# Patient Record
Sex: MALE | Employment: FULL TIME
[De-identification: names, ages, dates, MRNs, and addresses within clinical notes are randomized per-mention and may not be internally consistent; named-entity substitution may affect disease eponyms.]

---

## 2023-05-06 ENCOUNTER — APPOINTMENT (OUTPATIENT)
Facility: HOSPITAL | Age: 63
DRG: 065 | End: 2023-05-06
Payer: COMMERCIAL

## 2023-05-06 ENCOUNTER — HOSPITAL ENCOUNTER (INPATIENT)
Facility: HOSPITAL | Age: 63
LOS: 3 days | Discharge: HOME | DRG: 065 | End: 2023-05-09
Attending: EMERGENCY MEDICINE | Admitting: INTERNAL MEDICINE
Payer: COMMERCIAL

## 2023-05-06 DIAGNOSIS — R53.1 LEFT-SIDED WEAKNESS: Primary | ICD-10-CM

## 2023-05-06 DIAGNOSIS — I61.9 CVA (CEREBROVASCULAR ACCIDENT DUE TO INTRACEREBRAL HEMORRHAGE) (HCC): ICD-10-CM

## 2023-05-06 LAB
ALBUMIN SERPL-MCNC: 3.9 G/DL (ref 3.5–5)
ALBUMIN/GLOB SERPL: 1.1 (ref 1.1–2.2)
ALP SERPL-CCNC: 68 U/L (ref 45–117)
ALT SERPL-CCNC: 26 U/L (ref 12–78)
ANION GAP SERPL CALC-SCNC: 5 MMOL/L (ref 5–15)
AST SERPL-CCNC: 21 U/L (ref 15–37)
BASOPHILS # BLD: 0 K/UL (ref 0–0.1)
BASOPHILS NFR BLD: 0 % (ref 0–1)
BILIRUB SERPL-MCNC: 0.9 MG/DL (ref 0.2–1)
BUN SERPL-MCNC: 22 MG/DL (ref 6–20)
BUN/CREAT SERPL: 17 (ref 12–20)
CALCIUM SERPL-MCNC: 9.8 MG/DL (ref 8.5–10.1)
CHLORIDE SERPL-SCNC: 107 MMOL/L (ref 97–108)
CO2 SERPL-SCNC: 27 MMOL/L (ref 21–32)
CREAT SERPL-MCNC: 1.29 MG/DL (ref 0.7–1.3)
DIFFERENTIAL METHOD BLD: NORMAL
EOSINOPHIL # BLD: 0.3 K/UL (ref 0–0.4)
EOSINOPHIL NFR BLD: 4 % (ref 0–7)
ERYTHROCYTE [DISTWIDTH] IN BLOOD BY AUTOMATED COUNT: 13.5 % (ref 11.5–14.5)
GLOBULIN SER CALC-MCNC: 3.4 G/DL (ref 2–4)
GLUCOSE SERPL-MCNC: 107 MG/DL (ref 65–100)
HCT VFR BLD AUTO: 44.5 % (ref 36.6–50.3)
HGB BLD-MCNC: 14.6 G/DL (ref 12.1–17)
IMM GRANULOCYTES # BLD AUTO: 0 K/UL (ref 0–0.04)
IMM GRANULOCYTES NFR BLD AUTO: 0 % (ref 0–0.5)
INR PPP: 1 (ref 0.9–1.1)
LYMPHOCYTES # BLD: 1.7 K/UL (ref 0.8–3.5)
LYMPHOCYTES NFR BLD: 18 % (ref 12–49)
MCH RBC QN AUTO: 31.1 PG (ref 26–34)
MCHC RBC AUTO-ENTMCNC: 32.8 G/DL (ref 30–36.5)
MCV RBC AUTO: 94.7 FL (ref 80–99)
MONOCYTES # BLD: 0.6 K/UL (ref 0–1)
MONOCYTES NFR BLD: 6 % (ref 5–13)
NEUTS SEG # BLD: 7.1 K/UL (ref 1.8–8)
NEUTS SEG NFR BLD: 72 % (ref 32–75)
NRBC # BLD: 0 K/UL (ref 0–0.01)
NRBC BLD-RTO: 0 PER 100 WBC
PLATELET # BLD AUTO: 202 K/UL (ref 150–400)
PMV BLD AUTO: 9.1 FL (ref 8.9–12.9)
POTASSIUM SERPL-SCNC: 4.1 MMOL/L (ref 3.5–5.1)
PROT SERPL-MCNC: 7.3 G/DL (ref 6.4–8.2)
PROTHROMBIN TIME: 10.3 SEC (ref 9–11.1)
RBC # BLD AUTO: 4.7 M/UL (ref 4.1–5.7)
SODIUM SERPL-SCNC: 139 MMOL/L (ref 136–145)
TROPONIN I SERPL HS-MCNC: 11 NG/L (ref 0–76)
WBC # BLD AUTO: 9.8 K/UL (ref 4.1–11.1)

## 2023-05-06 PROCEDURE — 85610 PROTHROMBIN TIME: CPT

## 2023-05-06 PROCEDURE — 6370000000 HC RX 637 (ALT 250 FOR IP): Performed by: INTERNAL MEDICINE

## 2023-05-06 PROCEDURE — 36415 COLL VENOUS BLD VENIPUNCTURE: CPT

## 2023-05-06 PROCEDURE — 80053 COMPREHEN METABOLIC PANEL: CPT

## 2023-05-06 PROCEDURE — 6370000000 HC RX 637 (ALT 250 FOR IP): Performed by: EMERGENCY MEDICINE

## 2023-05-06 PROCEDURE — 6360000002 HC RX W HCPCS: Performed by: INTERNAL MEDICINE

## 2023-05-06 PROCEDURE — 70450 CT HEAD/BRAIN W/O DYE: CPT

## 2023-05-06 PROCEDURE — 1100000000 HC RM PRIVATE

## 2023-05-06 PROCEDURE — 99285 EMERGENCY DEPT VISIT HI MDM: CPT

## 2023-05-06 PROCEDURE — 83921 ORGANIC ACID SINGLE QUANT: CPT

## 2023-05-06 PROCEDURE — 70551 MRI BRAIN STEM W/O DYE: CPT

## 2023-05-06 PROCEDURE — 84484 ASSAY OF TROPONIN QUANT: CPT

## 2023-05-06 PROCEDURE — 85025 COMPLETE CBC W/AUTO DIFF WBC: CPT

## 2023-05-06 RX ORDER — POLYETHYLENE GLYCOL 3350 17 G/17G
17 POWDER, FOR SOLUTION ORAL DAILY PRN
Status: DISCONTINUED | OUTPATIENT
Start: 2023-05-06 | End: 2023-05-09 | Stop reason: HOSPADM

## 2023-05-06 RX ORDER — ONDANSETRON 4 MG/1
4 TABLET, ORALLY DISINTEGRATING ORAL EVERY 8 HOURS PRN
Status: DISCONTINUED | OUTPATIENT
Start: 2023-05-06 | End: 2023-05-09 | Stop reason: HOSPADM

## 2023-05-06 RX ORDER — LABETALOL HYDROCHLORIDE 5 MG/ML
10 INJECTION, SOLUTION INTRAVENOUS EVERY 10 MIN PRN
Status: DISCONTINUED | OUTPATIENT
Start: 2023-05-06 | End: 2023-05-09 | Stop reason: HOSPADM

## 2023-05-06 RX ORDER — ASPIRIN 81 MG/1
81 TABLET, CHEWABLE ORAL
Status: ACTIVE | OUTPATIENT
Start: 2023-05-06 | End: 2023-05-07

## 2023-05-06 RX ORDER — CLOPIDOGREL BISULFATE 75 MG/1
75 TABLET ORAL DAILY
Status: DISCONTINUED | OUTPATIENT
Start: 2023-05-06 | End: 2023-05-09 | Stop reason: HOSPADM

## 2023-05-06 RX ORDER — ONDANSETRON 2 MG/ML
4 INJECTION INTRAMUSCULAR; INTRAVENOUS EVERY 6 HOURS PRN
Status: DISCONTINUED | OUTPATIENT
Start: 2023-05-06 | End: 2023-05-09 | Stop reason: HOSPADM

## 2023-05-06 RX ORDER — ROSUVASTATIN CALCIUM 40 MG/1
40 TABLET, COATED ORAL NIGHTLY
Status: DISCONTINUED | OUTPATIENT
Start: 2023-05-06 | End: 2023-05-09 | Stop reason: HOSPADM

## 2023-05-06 RX ORDER — ENOXAPARIN SODIUM 100 MG/ML
30 INJECTION SUBCUTANEOUS 2 TIMES DAILY
Status: DISCONTINUED | OUTPATIENT
Start: 2023-05-06 | End: 2023-05-09 | Stop reason: HOSPADM

## 2023-05-06 RX ORDER — CLOPIDOGREL BISULFATE 75 MG/1
75 TABLET ORAL
Status: COMPLETED | OUTPATIENT
Start: 2023-05-06 | End: 2023-05-06

## 2023-05-06 RX ORDER — ASPIRIN 81 MG/1
81 TABLET ORAL DAILY
Status: DISCONTINUED | OUTPATIENT
Start: 2023-05-06 | End: 2023-05-09 | Stop reason: HOSPADM

## 2023-05-06 RX ORDER — NICOTINE 21 MG/24HR
1 PATCH, TRANSDERMAL 24 HOURS TRANSDERMAL DAILY
Status: DISCONTINUED | OUTPATIENT
Start: 2023-05-06 | End: 2023-05-09 | Stop reason: HOSPADM

## 2023-05-06 RX ADMIN — ENOXAPARIN SODIUM 30 MG: 100 INJECTION SUBCUTANEOUS at 22:57

## 2023-05-06 RX ADMIN — ROSUVASTATIN 40 MG: 40 TABLET, FILM COATED ORAL at 22:58

## 2023-05-06 RX ADMIN — CLOPIDOGREL BISULFATE 75 MG: 75 TABLET ORAL at 19:18

## 2023-05-06 ASSESSMENT — LIFESTYLE VARIABLES
HOW MANY STANDARD DRINKS CONTAINING ALCOHOL DO YOU HAVE ON A TYPICAL DAY: PATIENT DOES NOT DRINK
HOW OFTEN DO YOU HAVE A DRINK CONTAINING ALCOHOL: MONTHLY OR LESS

## 2023-05-06 ASSESSMENT — PAIN DESCRIPTION - LOCATION: LOCATION: HEAD

## 2023-05-06 ASSESSMENT — ENCOUNTER SYMPTOMS
SHORTNESS OF BREATH: 0
ABDOMINAL DISTENTION: 0

## 2023-05-06 ASSESSMENT — PAIN SCALES - GENERAL: PAINLEVEL_OUTOF10: 2

## 2023-05-07 ENCOUNTER — APPOINTMENT (OUTPATIENT)
Facility: HOSPITAL | Age: 63
DRG: 065 | End: 2023-05-07
Payer: COMMERCIAL

## 2023-05-07 LAB
CHOLEST SERPL-MCNC: 191 MG/DL
EKG ATRIAL RATE: 87 BPM
EKG DIAGNOSIS: NORMAL
EKG P AXIS: 32 DEGREES
EKG P-R INTERVAL: 136 MS
EKG Q-T INTERVAL: 348 MS
EKG QRS DURATION: 82 MS
EKG QTC CALCULATION (BAZETT): 418 MS
EKG R AXIS: 6 DEGREES
EKG T AXIS: -8 DEGREES
EKG VENTRICULAR RATE: 87 BPM
ERYTHROCYTE [DISTWIDTH] IN BLOOD BY AUTOMATED COUNT: 13.5 % (ref 11.5–14.5)
EST. AVERAGE GLUCOSE BLD GHB EST-MCNC: 117 MG/DL
HBA1C MFR BLD: 5.7 % (ref 4–5.6)
HCT VFR BLD AUTO: 43.5 % (ref 36.6–50.3)
HDLC SERPL-MCNC: 40 MG/DL
HDLC SERPL: 4.8 (ref 0–5)
HGB BLD-MCNC: 14 G/DL (ref 12.1–17)
LDLC SERPL CALC-MCNC: 129 MG/DL (ref 0–100)
MCH RBC QN AUTO: 31 PG (ref 26–34)
MCHC RBC AUTO-ENTMCNC: 32.2 G/DL (ref 30–36.5)
MCV RBC AUTO: 96.2 FL (ref 80–99)
NRBC # BLD: 0 K/UL (ref 0–0.01)
NRBC BLD-RTO: 0 PER 100 WBC
PLATELET # BLD AUTO: 189 K/UL (ref 150–400)
PMV BLD AUTO: 9.1 FL (ref 8.9–12.9)
RBC # BLD AUTO: 4.52 M/UL (ref 4.1–5.7)
TRIGL SERPL-MCNC: 110 MG/DL
VLDLC SERPL CALC-MCNC: 22 MG/DL
WBC # BLD AUTO: 9.3 K/UL (ref 4.1–11.1)

## 2023-05-07 PROCEDURE — 70498 CT ANGIOGRAPHY NECK: CPT

## 2023-05-07 PROCEDURE — 99222 1ST HOSP IP/OBS MODERATE 55: CPT | Performed by: INTERNAL MEDICINE

## 2023-05-07 PROCEDURE — 1100000000 HC RM PRIVATE

## 2023-05-07 PROCEDURE — 97165 OT EVAL LOW COMPLEX 30 MIN: CPT

## 2023-05-07 PROCEDURE — 6370000000 HC RX 637 (ALT 250 FOR IP): Performed by: INTERNAL MEDICINE

## 2023-05-07 PROCEDURE — 6360000002 HC RX W HCPCS: Performed by: INTERNAL MEDICINE

## 2023-05-07 PROCEDURE — 83036 HEMOGLOBIN GLYCOSYLATED A1C: CPT

## 2023-05-07 PROCEDURE — 6360000004 HC RX CONTRAST MEDICATION: Performed by: INTERNAL MEDICINE

## 2023-05-07 PROCEDURE — 97535 SELF CARE MNGMENT TRAINING: CPT

## 2023-05-07 PROCEDURE — 36415 COLL VENOUS BLD VENIPUNCTURE: CPT

## 2023-05-07 PROCEDURE — 97161 PT EVAL LOW COMPLEX 20 MIN: CPT

## 2023-05-07 PROCEDURE — 97530 THERAPEUTIC ACTIVITIES: CPT

## 2023-05-07 PROCEDURE — 97116 GAIT TRAINING THERAPY: CPT

## 2023-05-07 PROCEDURE — 97112 NEUROMUSCULAR REEDUCATION: CPT

## 2023-05-07 PROCEDURE — 80061 LIPID PANEL: CPT

## 2023-05-07 PROCEDURE — 85027 COMPLETE CBC AUTOMATED: CPT

## 2023-05-07 RX ADMIN — IOPAMIDOL 100 ML: 755 INJECTION, SOLUTION INTRAVENOUS at 11:42

## 2023-05-07 RX ADMIN — ROSUVASTATIN 40 MG: 40 TABLET, FILM COATED ORAL at 21:02

## 2023-05-07 RX ADMIN — ENOXAPARIN SODIUM 30 MG: 100 INJECTION SUBCUTANEOUS at 21:02

## 2023-05-07 RX ADMIN — ENOXAPARIN SODIUM 30 MG: 100 INJECTION SUBCUTANEOUS at 09:19

## 2023-05-07 RX ADMIN — ASPIRIN 81 MG: 81 TABLET, COATED ORAL at 09:18

## 2023-05-07 RX ADMIN — CLOPIDOGREL BISULFATE 75 MG: 75 TABLET ORAL at 09:18

## 2023-05-07 SDOH — HEALTH STABILITY: PHYSICAL HEALTH: ON AVERAGE, HOW MANY DAYS PER WEEK DO YOU ENGAGE IN MODERATE TO STRENUOUS EXERCISE (LIKE A BRISK WALK)?: 0 DAYS

## 2023-05-07 SDOH — ECONOMIC STABILITY: HOUSING INSECURITY
IN THE LAST 12 MONTHS, WAS THERE A TIME WHEN YOU DID NOT HAVE A STEADY PLACE TO SLEEP OR SLEPT IN A SHELTER (INCLUDING NOW)?: NO

## 2023-05-07 SDOH — ECONOMIC STABILITY: INCOME INSECURITY: IN THE LAST 12 MONTHS, WAS THERE A TIME WHEN YOU WERE NOT ABLE TO PAY THE MORTGAGE OR RENT ON TIME?: NO

## 2023-05-07 SDOH — ECONOMIC STABILITY: TRANSPORTATION INSECURITY
IN THE PAST 12 MONTHS, HAS THE LACK OF TRANSPORTATION KEPT YOU FROM MEDICAL APPOINTMENTS OR FROM GETTING MEDICATIONS?: NO

## 2023-05-07 SDOH — ECONOMIC STABILITY: FOOD INSECURITY: WITHIN THE PAST 12 MONTHS, THE FOOD YOU BOUGHT JUST DIDN'T LAST AND YOU DIDN'T HAVE MONEY TO GET MORE.: NEVER TRUE

## 2023-05-07 SDOH — HEALTH STABILITY: PHYSICAL HEALTH: ON AVERAGE, HOW MANY MINUTES DO YOU ENGAGE IN EXERCISE AT THIS LEVEL?: 0 MIN

## 2023-05-07 SDOH — ECONOMIC STABILITY: FOOD INSECURITY: WITHIN THE PAST 12 MONTHS, YOU WORRIED THAT YOUR FOOD WOULD RUN OUT BEFORE YOU GOT MONEY TO BUY MORE.: NEVER TRUE

## 2023-05-07 SDOH — ECONOMIC STABILITY: HOUSING INSECURITY: IN THE LAST 12 MONTHS, HOW MANY PLACES HAVE YOU LIVED?: 1

## 2023-05-07 SDOH — ECONOMIC STABILITY: TRANSPORTATION INSECURITY
IN THE PAST 12 MONTHS, HAS LACK OF TRANSPORTATION KEPT YOU FROM MEETINGS, WORK, OR FROM GETTING THINGS NEEDED FOR DAILY LIVING?: NO

## 2023-05-07 ASSESSMENT — SOCIAL DETERMINANTS OF HEALTH (SDOH)
WITHIN THE LAST YEAR, HAVE YOU BEEN AFRAID OF YOUR PARTNER OR EX-PARTNER?: NO
DO YOU BELONG TO ANY CLUBS OR ORGANIZATIONS SUCH AS CHURCH GROUPS UNIONS, FRATERNAL OR ATHLETIC GROUPS, OR SCHOOL GROUPS?: NO
HOW OFTEN DO YOU ATTENT MEETINGS OF THE CLUB OR ORGANIZATION YOU BELONG TO?: NEVER
IN A TYPICAL WEEK, HOW MANY TIMES DO YOU TALK ON THE PHONE WITH FAMILY, FRIENDS, OR NEIGHBORS?: MORE THAN THREE TIMES A WEEK
WITHIN THE LAST YEAR, HAVE YOU BEEN HUMILIATED OR EMOTIONALLY ABUSED IN OTHER WAYS BY YOUR PARTNER OR EX-PARTNER?: NO
WITHIN THE LAST YEAR, HAVE TO BEEN RAPED OR FORCED TO HAVE ANY KIND OF SEXUAL ACTIVITY BY YOUR PARTNER OR EX-PARTNER?: NO
HOW OFTEN DO YOU GET TOGETHER WITH FRIENDS OR RELATIVES?: MORE THAN THREE TIMES A WEEK
WITHIN THE LAST YEAR, HAVE YOU BEEN KICKED, HIT, SLAPPED, OR OTHERWISE PHYSICALLY HURT BY YOUR PARTNER OR EX-PARTNER?: NO
HOW OFTEN DO YOU ATTEND CHURCH OR RELIGIOUS SERVICES?: 1 TO 4 TIMES PER YEAR
ARE YOU MARRIED, WIDOWED, DIVORCED, SEPARATED, NEVER MARRIED, OR LIVING WITH A PARTNER?: PATIENT DECLINED
HOW HARD IS IT FOR YOU TO PAY FOR THE VERY BASICS LIKE FOOD, HOUSING, MEDICAL CARE, AND HEATING?: NOT VERY HARD

## 2023-05-07 ASSESSMENT — PAIN SCALES - GENERAL
PAINLEVEL_OUTOF10: 0

## 2023-05-07 ASSESSMENT — PATIENT HEALTH QUESTIONNAIRE - PHQ9
1. LITTLE INTEREST OR PLEASURE IN DOING THINGS: NOT AT ALL
SUM OF ALL RESPONSES TO PHQ9 QUESTIONS 1 & 2: 0
2. FEELING DOWN, DEPRESSED OR HOPELESS: NOT AT ALL

## 2023-05-08 ENCOUNTER — APPOINTMENT (OUTPATIENT)
Facility: HOSPITAL | Age: 63
DRG: 065 | End: 2023-05-08
Payer: COMMERCIAL

## 2023-05-08 LAB
ECHO AO ROOT DIAM: 3.2 CM
ECHO AO ROOT INDEX: 1.36 CM/M2
ECHO AV AREA PEAK VELOCITY: 2.7 CM2
ECHO AV AREA/BSA PEAK VELOCITY: 1.1 CM2/M2
ECHO AV PEAK GRADIENT: 15 MMHG
ECHO AV PEAK VELOCITY: 2 M/S
ECHO AV VELOCITY RATIO: 0.65
ECHO BSA: 2.42 M2
ECHO LA DIAMETER INDEX: 1.7 CM/M2
ECHO LA DIAMETER: 4 CM
ECHO LA TO AORTIC ROOT RATIO: 1.25
ECHO LV EDV A4C: 63 ML
ECHO LV EDV INDEX A4C: 27 ML/M2
ECHO LV EJECTION FRACTION A4C: 60 %
ECHO LV ESV A4C: 25 ML
ECHO LV ESV INDEX A4C: 11 ML/M2
ECHO LV FRACTIONAL SHORTENING: 27 % (ref 28–44)
ECHO LV INTERNAL DIMENSION DIASTOLE INDEX: 1.74 CM/M2
ECHO LV INTERNAL DIMENSION DIASTOLIC: 4.1 CM (ref 4.2–5.9)
ECHO LV INTERNAL DIMENSION SYSTOLIC INDEX: 1.28 CM/M2
ECHO LV INTERNAL DIMENSION SYSTOLIC: 3 CM
ECHO LV IVSD: 1.3 CM (ref 0.6–1)
ECHO LV MASS 2D: 193.5 G (ref 88–224)
ECHO LV MASS INDEX 2D: 82.3 G/M2 (ref 49–115)
ECHO LV POSTERIOR WALL DIASTOLIC: 1.3 CM (ref 0.6–1)
ECHO LV RELATIVE WALL THICKNESS RATIO: 0.63
ECHO LVOT AREA: 4.2 CM2
ECHO LVOT DIAM: 2.3 CM
ECHO LVOT PEAK GRADIENT: 6 MMHG
ECHO LVOT PEAK VELOCITY: 1.3 M/S
VAS LEFT CCA DIST EDV: 38.8 CM/S
VAS LEFT CCA DIST PSV: 92.8 CM/S
VAS LEFT CCA PROX EDV: 37.6 CM/S
VAS LEFT CCA PROX PSV: 99 CM/S
VAS LEFT ECA EDV: 25.3 CM/S
VAS LEFT ECA PSV: 119.8 CM/S
VAS LEFT ICA DIST EDV: 42.5 CM/S
VAS LEFT ICA DIST PSV: 87.9 CM/S
VAS LEFT ICA MID EDV: 42.5 CM/S
VAS LEFT ICA MID PSV: 84.2 CM/S
VAS LEFT ICA PROX EDV: 32.7 CM/S
VAS LEFT ICA PROX PSV: 71.9 CM/S
VAS LEFT ICA/CCA PSV: 0.9 NO UNITS
VAS LEFT SUBCLAVIAN PROX EDV: 0 CM/S
VAS LEFT SUBCLAVIAN PROX PSV: 79.3 CM/S
VAS LEFT VERTEBRAL EDV: 19.2 CM/S
VAS LEFT VERTEBRAL PSV: 40 CM/S
VAS RIGHT CCA DIST EDV: 22 CM/S
VAS RIGHT CCA DIST PSV: 52 CM/S
VAS RIGHT CCA PROX EDV: 27.3 CM/S
VAS RIGHT CCA PROX PSV: 83.3 CM/S
VAS RIGHT ECA EDV: 27.7 CM/S
VAS RIGHT ECA PSV: 207.8 CM/S
VAS RIGHT ICA DIST EDV: 51.9 CM/S
VAS RIGHT ICA DIST PSV: 126.1 CM/S
VAS RIGHT ICA MID EDV: 41.3 CM/S
VAS RIGHT ICA MID PSV: 114.4 CM/S
VAS RIGHT ICA PROX EDV: 204.5 CM/S
VAS RIGHT ICA PROX PSV: 361.5 CM/S
VAS RIGHT ICA/CCA PSV: 6.9 NO UNITS
VAS RIGHT SUBCLAVIAN PROX EDV: 0 CM/S
VAS RIGHT SUBCLAVIAN PROX PSV: 56 CM/S
VAS RIGHT VERTEBRAL EDV: 13 CM/S
VAS RIGHT VERTEBRAL PSV: 37.6 CM/S

## 2023-05-08 PROCEDURE — 93308 TTE F-UP OR LMTD: CPT

## 2023-05-08 PROCEDURE — 92523 SPEECH SOUND LANG COMPREHEN: CPT

## 2023-05-08 PROCEDURE — 6370000000 HC RX 637 (ALT 250 FOR IP): Performed by: INTERNAL MEDICINE

## 2023-05-08 PROCEDURE — 6370000000 HC RX 637 (ALT 250 FOR IP): Performed by: SURGERY

## 2023-05-08 PROCEDURE — 6360000002 HC RX W HCPCS: Performed by: INTERNAL MEDICINE

## 2023-05-08 PROCEDURE — 93880 EXTRACRANIAL BILAT STUDY: CPT

## 2023-05-08 PROCEDURE — 1100000000 HC RM PRIVATE

## 2023-05-08 RX ORDER — CLOPIDOGREL BISULFATE 75 MG/1
150 TABLET ORAL ONCE
Status: COMPLETED | OUTPATIENT
Start: 2023-05-08 | End: 2023-05-08

## 2023-05-08 RX ADMIN — ENOXAPARIN SODIUM 30 MG: 100 INJECTION SUBCUTANEOUS at 21:14

## 2023-05-08 RX ADMIN — ENOXAPARIN SODIUM 30 MG: 100 INJECTION SUBCUTANEOUS at 08:23

## 2023-05-08 RX ADMIN — ASPIRIN 81 MG: 81 TABLET, COATED ORAL at 08:23

## 2023-05-08 RX ADMIN — CLOPIDOGREL BISULFATE 150 MG: 75 TABLET ORAL at 19:00

## 2023-05-08 RX ADMIN — ROSUVASTATIN 40 MG: 40 TABLET, FILM COATED ORAL at 21:14

## 2023-05-08 RX ADMIN — CLOPIDOGREL BISULFATE 75 MG: 75 TABLET ORAL at 08:23

## 2023-05-08 ASSESSMENT — PAIN SCALES - GENERAL
PAINLEVEL_OUTOF10: 0

## 2023-05-08 ASSESSMENT — ENCOUNTER SYMPTOMS
SHORTNESS OF BREATH: 0
VOMITING: 0
COUGH: 0
COLOR CHANGE: 0
NAUSEA: 0

## 2023-05-08 NOTE — PLAN OF CARE
Problem: Discharge Planning  Goal: Discharge to home or other facility with appropriate resources  5/8/2023 0918 by Anne Vogel RN  Outcome: Progressing  5/8/2023 0213 by Valeria Mak RN  Outcome: Progressing  5/8/2023 0212 by Valeria Mak RN  Outcome: Progressing     Problem: Pain  Goal: Verbalizes/displays adequate comfort level or baseline comfort level  5/8/2023 0918 by Anne Vogel RN  Outcome: Progressing  5/8/2023 0213 by Valeria Mak RN  Outcome: Progressing  5/8/2023 0212 by Valeria Mak RN  Outcome: Progressing     Problem: Safety - Adult  Goal: Free from fall injury  5/8/2023 0918 by Anne Vogel RN  Outcome: Progressing  5/8/2023 0213 by Valeria Mak RN  Outcome: Progressing  5/8/2023 0212 by Valeria Mak RN  Outcome: Progressing

## 2023-05-08 NOTE — H&P
End of Shift Note    Bedside shift change report given to Jes Whittaker RN (oncoming nurse) by Hailee Sahu RN (offgoing nurse). Report included the following information SBAR, ed summary    Shift worked:  NIGHTS    Shift summary and any significant changes:     No significant changes. PT/OT worked with patient. MRI done. Duplex ordered. Vascular surgery consulted. Concerns for physician to address:  none   Zone phone for oncoming shift:   [phone]     Patient Information  Roxanna Lopez  58 y.o.  5/6/2023  3:36 PM by Xavi Coronado MD. Roxanna Lopez was admitted from Home    Problem List  Patient Active Problem List    Diagnosis Date Noted    CVA (cerebrovascular accident due to intracerebral hemorrhage) ([facility] [address] 05/06/2023     Past Medical History:   Diagnosis Date    GERD (gastroesophageal reflux disease)        Core Measures:  CVA: yes yes    Activity:     Number times ambulated in hallways past shift: 1  Number of times OOB to chair past shift: 3    Cardiac:   Cardiac Monitoring: Yes       Sinus rhythm    Access:   Current line(s): Other: PIV   Central Line? No    Genitourinary:   Urinary status: Voiding  Urinary Catheter? No    Respiratory:      Chronic home O2 use?: No  Incentive spirometer at bedside: no       GI:     Current diet:  ADULT DIET; Regular; No milk  Passing flatus: Yes  Tolerating current diet: Yes       Pain Management:   Patient states pain is manageable on current regimen: Yes    Skin:   21  Interventions: Increase time out of bed.     Patient Safety:  Fall Score:  Low  Interventions:     @Rollbelt  @dexterity to release roll belt  Yes/No ( must document dexterity  here by stating Yes or No here, otherwise this is a restraint and must follow restraint documentation policy.)    DVT prophylaxis:  DVT prophylaxis Med- yes  DVT prophylaxis SCD or VIKKI- no     Wounds: (If Applicable)  Wounds- none  Location     Active Consults:  IP CONSULT TO NEUROLOGY  IP CONSULT TO VASCULAR SURGERY    Length of

## 2023-05-08 NOTE — PLAN OF CARE
Problem: Discharge Planning  Goal: Discharge to home or other facility with appropriate resources  5/8/2023 0918 by Adriel Flynn RN  Outcome: Progressing     Problem: Pain  Goal: Verbalizes/displays adequate comfort level or baseline comfort level  5/8/2023 0918 by Adriel Flynn RN  Outcome: Progressing     Problem: Safety - Adult  Goal: Free from fall injury  5/8/2023 1840 by Katy Lee RN  Outcome: Progressing  5/8/2023 0918 by Adriel Flynn RN  Outcome: Progressing     Problem: Neurosensory - Adult  Goal: Achieves stable or improved neurological status  Outcome: Progressing     Problem: Neurosensory - Adult  Goal: Absence of seizures  Outcome: Progressing

## 2023-05-08 NOTE — CONSULTS
Vascular Surgery Consult Note  5/8/2023    Subjective:     Bimal Warren is a 58 y.o. male with a pmhx significant for GERD. He is a current smoker. He is admitted to the hospital with a right frontoparietal stroke presenting with left sided weakness and paresthesias. His CTA revealed a 70% right ICA stenosis and a 50% left ICA stenosis. We have been asked to evaluate. Past Medical History:   Diagnosis Date    GERD (gastroesophageal reflux disease)       Past Surgical History:   Procedure Laterality Date    TONSILLECTOMY       History reviewed. No pertinent family history. Social History     Tobacco Use    Smoking status: Every Day     Packs/day: 1.00     Years: 40.00     Pack years: 40.00     Types: Cigarettes    Smokeless tobacco: Never   Substance Use Topics    Alcohol use: Yes     Comment: last drank 2021 years ago       Home medications   None     No Known Allergies     Review of Systems   Constitutional:  Positive for chills and fatigue. Negative for activity change and fever. HENT:  Negative for congestion. Eyes:  Negative for visual disturbance. Respiratory:  Negative for cough and shortness of breath. Cardiovascular:  Negative for chest pain and leg swelling. Gastrointestinal:  Negative for nausea and vomiting. Endocrine: Negative for polydipsia and polyuria. Genitourinary: Negative. Musculoskeletal:  Positive for gait problem. Negative for arthralgias and myalgias. Skin:  Negative for color change and wound. Allergic/Immunologic: Negative for immunocompromised state. Neurological:  Positive for facial asymmetry, weakness (left sided) and numbness.      Objective:     Patient Vitals for the past 24 hrs:   BP Temp Temp src Pulse Resp SpO2 Height Weight   05/08/23 1018 127/79 -- -- -- -- -- 5' 10.98\" (1.803 m) 257 lb (116.6 kg)   05/08/23 0651 124/79 97.9 °F (36.6 °C) Oral 67 18 95 % -- --   05/08/23 0426 -- -- Oral -- -- -- -- --   05/08/23 0358 137/82 97.9 °F (36.6 °C) --

## 2023-05-09 VITALS
SYSTOLIC BLOOD PRESSURE: 111 MMHG | HEIGHT: 71 IN | HEART RATE: 66 BPM | OXYGEN SATURATION: 93 % | BODY MASS INDEX: 35.98 KG/M2 | WEIGHT: 257 LBS | DIASTOLIC BLOOD PRESSURE: 80 MMHG | RESPIRATION RATE: 16 BRPM | TEMPERATURE: 97.9 F

## 2023-05-09 LAB
BASOPHILS # BLD: 0.1 K/UL (ref 0–0.1)
BASOPHILS NFR BLD: 1 % (ref 0–1)
DIFFERENTIAL METHOD BLD: NORMAL
EOSINOPHIL # BLD: 0.4 K/UL (ref 0–0.4)
EOSINOPHIL NFR BLD: 4 % (ref 0–7)
ERYTHROCYTE [DISTWIDTH] IN BLOOD BY AUTOMATED COUNT: 13.5 % (ref 11.5–14.5)
HCT VFR BLD AUTO: 44.7 % (ref 36.6–50.3)
HGB BLD-MCNC: 14.6 G/DL (ref 12.1–17)
IMM GRANULOCYTES # BLD AUTO: 0 K/UL (ref 0–0.04)
IMM GRANULOCYTES NFR BLD AUTO: 0 % (ref 0–0.5)
LYMPHOCYTES # BLD: 2.2 K/UL (ref 0.8–3.5)
LYMPHOCYTES NFR BLD: 25 % (ref 12–49)
MCH RBC QN AUTO: 30.7 PG (ref 26–34)
MCHC RBC AUTO-ENTMCNC: 32.7 G/DL (ref 30–36.5)
MCV RBC AUTO: 93.9 FL (ref 80–99)
MONOCYTES # BLD: 0.6 K/UL (ref 0–1)
MONOCYTES NFR BLD: 6 % (ref 5–13)
NEUTS SEG # BLD: 5.7 K/UL (ref 1.8–8)
NEUTS SEG NFR BLD: 64 % (ref 32–75)
NRBC # BLD: 0 K/UL (ref 0–0.01)
NRBC BLD-RTO: 0 PER 100 WBC
P2Y12 PLT RESPONSE: 132 PRU (ref 194–418)
PLATELET # BLD AUTO: 183 K/UL (ref 150–400)
PMV BLD AUTO: 9 FL (ref 8.9–12.9)
RBC # BLD AUTO: 4.76 M/UL (ref 4.1–5.7)
WBC # BLD AUTO: 8.9 K/UL (ref 4.1–11.1)

## 2023-05-09 PROCEDURE — 6370000000 HC RX 637 (ALT 250 FOR IP): Performed by: INTERNAL MEDICINE

## 2023-05-09 PROCEDURE — 85576 BLOOD PLATELET AGGREGATION: CPT

## 2023-05-09 PROCEDURE — 6360000002 HC RX W HCPCS: Performed by: INTERNAL MEDICINE

## 2023-05-09 PROCEDURE — 85025 COMPLETE CBC W/AUTO DIFF WBC: CPT

## 2023-05-09 PROCEDURE — 36415 COLL VENOUS BLD VENIPUNCTURE: CPT

## 2023-05-09 RX ORDER — CLOPIDOGREL BISULFATE 75 MG/1
75 TABLET ORAL DAILY
Qty: 30 TABLET | Refills: 0 | Status: SHIPPED | OUTPATIENT
Start: 2023-05-10 | End: 2023-06-09

## 2023-05-09 RX ORDER — ASPIRIN 81 MG/1
81 TABLET ORAL DAILY
Qty: 30 TABLET | Refills: 0 | Status: SHIPPED | OUTPATIENT
Start: 2023-05-10

## 2023-05-09 RX ORDER — NICOTINE 21 MG/24HR
1 PATCH, TRANSDERMAL 24 HOURS TRANSDERMAL DAILY PRN
Qty: 30 PATCH | Refills: 0 | Status: SHIPPED | OUTPATIENT
Start: 2023-05-09

## 2023-05-09 RX ORDER — ROSUVASTATIN CALCIUM 40 MG/1
40 TABLET, COATED ORAL NIGHTLY
Qty: 30 TABLET | Refills: 0 | Status: SHIPPED | OUTPATIENT
Start: 2023-05-09

## 2023-05-09 RX ADMIN — ENOXAPARIN SODIUM 30 MG: 100 INJECTION SUBCUTANEOUS at 08:13

## 2023-05-09 RX ADMIN — CLOPIDOGREL BISULFATE 75 MG: 75 TABLET ORAL at 08:13

## 2023-05-09 RX ADMIN — ASPIRIN 81 MG: 81 TABLET, COATED ORAL at 08:13

## 2023-05-09 ASSESSMENT — PAIN SCALES - GENERAL
PAINLEVEL_OUTOF10: 0
PAINLEVEL_OUTOF10: 0

## 2023-05-09 NOTE — DISCHARGE INSTRUCTIONS
You were treated for a stroke. You have a narrowing in one of your neck arteries. You will be undergoing a procedure with Vascular Surgery on Friday. Please followup with Vascular, Neurology, and your PCP. Your medication regimen has changed.

## 2023-05-09 NOTE — PROGRESS NOTES
End of Shift Note    Bedside shift change report given to [address] (oncoming nurse) by Ambrocio Mclaughlin RN (offgoing nurse). Report included the following information Nurse Handoff Report, Index, and MAR      Shift worked:  7a-7p   Shift summary and any significant changes:    Vascular consulted pt. No significant changes. Concerns for physician to address:  none   Zone phone for oncoming shift:  [phone]     Patient Information  Darryl Nova  58 y.o.  5/6/2023  3:36 PM by Shelby Springer MD. Darryl Nova was admitted from Home    Problem List  Patient Active Problem List    Diagnosis Date Noted    CVA (cerebrovascular accident due to intracerebral hemorrhage) ([facility] [address] 05/06/2023     Past Medical History:   Diagnosis Date    GERD (gastroesophageal reflux disease)        Core Measures:  CVA: Yes Yes  CHF:No No  PNA:NoNo    Activity:  Activity: Ambulate in room  Number times ambulated in hallways past shift: 1  Number of times OOB to chair past shift: 1    Cardiac:   Cardiac Monitoring: Yes      Cardiac Rhythm: Sinus rhythm    Access:   Current line(s): PIV  Central Line? No   PICC LINE? No     Genitourinary:   Urinary status: voiding   Urinary Catheter? No     Respiratory:   O2 Device: None (Room air)  Chronic home O2 use?: no  Incentive spirometer at bedside: no       GI:     Current diet:  ADULT DIET; Regular;  No milk  Passing flatus: yes  Tolerating current diet: yes       Pain Management:   Patient states pain is manageable on current regimen: yes    Skin:  Reji Scale Score: 22  Interventions: float heels, increase time out of bed, and PT/OT consult    Patient Safety:  Fall Score:    Interventions:      @Rollbelt  @dexterity to release roll belt  Yes/No ( must document dexterity  here by stating Yes or No here, otherwise this is a restraint and must follow restraint documentation policy.)    DVT prophylaxis:  DVT prophylaxis Med- yes  DVT prophylaxis SCD or VIKKI- no     Wounds: (If Applicable)  Wounds- no  Location
End of Shift Note    Bedside shift change report given to [place] RN (oncoming nurse) by Isa Upton RN (offgoing nurse). Report included the following information Nurse Handoff Report, Index, and MAR      Shift worked:  NIGHTS   Shift summary and any significant changes:    Vascular consulted pt possible discharge today depending on if pt wants procedure outpatient or inpatient. SBA to BR. No significant changes. Concerns for physician to address:  none   Zone phone for oncoming shift:  [phone]     Patient Information  Chayo Rebollar  58 y.o.  5/6/2023  3:36 PM by Lockie Lefort, MD. Chayo Rebollar was admitted from Home    Problem List  Patient Active Problem List    Diagnosis Date Noted    CVA (cerebrovascular accident due to intracerebral hemorrhage) ([facility] [address] 05/06/2023     Past Medical History:   Diagnosis Date    GERD (gastroesophageal reflux disease)        Core Measures:  CVA: Yes Yes  CHF:No No  PNA:NoNo    Activity:  Activity: Ambulate in room  Number times ambulated in hallways past shift: 1  Number of times OOB to chair past shift: 1    Cardiac:   Cardiac Monitoring: Yes      Cardiac Rhythm: Sinus rhythm    Access:   Current line(s): PIV  Central Line? No   PICC LINE? No     Genitourinary:   Urinary status: voiding   Urinary Catheter? No     Respiratory:   O2 Device: None (Room air)  Chronic home O2 use?: no  Incentive spirometer at bedside: no       GI:     Current diet:  ADULT DIET; Regular;  No milk  Passing flatus: yes  Tolerating current diet: yes       Pain Management:   Patient states pain is manageable on current regimen: yes    Skin:  Reji Scale Score: 21  Interventions: float heels, increase time out of bed, and PT/OT consult    Patient Safety:  Fall Score:    Interventions:      @Rollbelt  @dexterity to release roll belt  Yes/No ( must document dexterity  here by stating Yes or No here, otherwise this is a restraint and must follow restraint documentation policy.)    DVT prophylaxis:  DVT
PCP is with [facility], walk-in facility from 8:00am to 8:00p.m. Patient will need to walk in to see PCP. Cannot make appt. Pending patient discharge.   Kee Soto, Care Management Assistant
Progress Note  Date:2023       Room:[facility]  Patient Name:Alfonso Gasca      YOB: 1960     Age:62 y.o. Subjective      Terrie Crowder is a 58 y.o. male with a pmhx significant for GERD. He is a current smoker. He is admitted to the hospital with a right frontoparietal stroke presenting with left sided weakness and paresthesias. His CTA revealed a 70% right ICA stenosis and a 50% left ICA stenosis. Plan is for right TCAR on Friday (23). Objective         Vitals Last 24 Hours:  TEMPERATURE:  Temp  Av.9 °F (36.6 °C)  Min: 97.5 °F (36.4 °C)  Max: 98.2 °F (36.8 °C)  RESPIRATIONS RANGE: Resp  Av.8  Min: 16  Max: 19  PULSE OXIMETRY RANGE: SpO2  Av %  Min: 90 %  Max: 96 %  PULSE RANGE: Pulse  Av.4  Min: 63  Max: 72  BLOOD PRESSURE RANGE: Systolic (76SXJ), LRE:006 , Min:111 , HRB:289   ; Diastolic (10YNH), TY, Min:78, Max:93    I/O (24Hr): No intake or output data in the 24 hours ending 23 0836    Physical exam  Constitutional:       Appearance: He is obese. HENT:      Head: Normocephalic. Eyes:      Pupils: Pupils are equal, round, and reactive to light. Neck:      Vascular: Carotid bruit (bilateral) present. Cardiovascular:      Rate and Rhythm: Normal rate and regular rhythm. Pulmonary:      Effort: Pulmonary effort is normal. No respiratory distress. Abdominal:      General: There is no distension. Palpations: Abdomen is soft. Musculoskeletal:         General: Normal range of motion. Cervical back: Normal range of motion. Skin:     General: Skin is warm. Neurological:      Mental Status: He is alert.       Comments: Paresthesias persist     Labs/Imaging/Diagnostics    Labs:  CBC:  Recent Labs     23  1613 23  0020 23  0402   WBC 9.8 9.3 8.9   RBC 4.70 4.52 4.76   HGB 14.6 14.0 14.6   HCT 44.5 43.5 44.7   MCV 94.7 96.2 93.9   RDW 13.5 13.5 13.5    189 183     CHEMISTRIES:  Recent Labs     23  1618
SLP ALL NOTES  Facility/Department: [facility] 1 NEUROSCIENCE TELEMETRY  Initial Speech/Language/Cognitive Assessment    NAME: Chayo Rebollar  : 1960   MRN: [id number]  ADMISSION DATE: 2023  ADMITTING DIAGNOSIS: has CVA (cerebrovascular accident due to intracerebral hemorrhage) ([address] on their problem list.  DATE ONSET: 23    Date of Eval: 2023   Evaluating Therapist: MARY Miles    RECENT RESULTS  CT OF HEAD/MRI: R FP CVA    Primary Complaint: none   Pain:  Pain Assessment  Pain Assessment: None - Denies Pain  Pain Level: 0  Patient's Stated Pain Goal: 0 - No pain  Pain Location: Head           Assessment:       Patient had a R frontoparietal CVA but his basic language, motor speech and integrated language skills are wnl on exam today. He has no complaints for which he needs speech therapy follow up. Swallowing is wnl. 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 Objective:                  Auditory Comprehension  Comprehension: Within Functional Limits         Expression  Primary Mode of Expression: Verbal    Verbal Expression  Verbal Expression: Within functional limits       Motor speech wnl            Cognition:      Orientation  Overall Orientation Status: Within Normal Limits  Attention  Attention: Within Functional Limits  Memory  Memory: Within Functional Limits  Problem Solving  Problem Solving: Within Functional Limits  Numeric Reasoning  Numeric Reasoning: Within Functional Limits  Abstract Reasoning  Abstract Reasoning: Within Functional Limits  Safety/Judgment  Safety/Judgment: Within Functional Limits    Additional Assessments:             Prognosis:   Excellent     Education:   Wnl in all assessed areas
include:  Recent Labs     05/06/23  1613 05/07/23  0020   WBC 9.8 9.3   HGB 14.6 14.0   HCT 44.5 43.5    189       Recent Labs     05/06/23  1613      K 4.1      CO2 27   BUN 22*   ALT 26   INR 1.0         Signed: Maury Luong MD

## 2023-05-09 NOTE — DISCHARGE SUMMARY
Discharge Summary    Name: Vance Zhou  [id number]  YOB: 1960 (Age: 58 y.o.)   Date of Admission: 5/6/2023  Date of Discharge: 5/9/2023  Attending Physician: Corrinne Helper, MD    Discharge Diagnosis:   Right frontoparietal acute stroke  Symptomatic right moderate to severe carotid stenosis  Asymptomatic left moderate carotid stenosis  Tobacco use    Consultations:  IP CONSULT TO NEUROLOGY  IP CONSULT TO VASCULAR SURGERY      Brief Admission History/Reason for Admission Per Amos Young MD:   Vance Zhou is a 58 y.o.  male with PMHx significant for GERD presents to the ER for L sided weakness. Pt states he couldn't move his L leg and had to pick it up to move it. Pt was having difficulty walking. Symptoms improved in the ER. Patient states he has had these symptoms off and on last week and resolved on its own. Symptoms is worst today which lead the patient to the ER for evaluation. He states he has to \"focus and think about it\" to walk. No other neurological symptoms reported. No fever, chills, cp, sob, palpitations, n/v/d. Vitals/labs/imaging reviewed. Brief Hospital Course by Main Problems:       Right frontoparietal acute stroke  Symptomatic right moderate to severe carotid stenosis  Asymptomatic left moderate carotid stenosis  -presenting symptoms: intermittent L sided weakness and numbness, facial numbness  -CT head neg for acute process,  MRI head showed punctate foci of embolic infarction in the right frontoparietal region. No  evidence of hemorrhage. -CTA head showed 70%-severe stenosis of NADINE   -ECHO to r/o intracardiac thrombus - normal EF, no WMA. No PFO, did not comment on cardiac thrombus  -A1C 5.7,   -continue ASA/ plavix and statin  -Educated patient on risk factors for stroke, intervention and prevention.   -PT/OT/SLP consult - clear  -discussed with neurology, agreed with vascular surgery consultation for evaluation of CEA/

## 2023-05-09 NOTE — PLAN OF CARE
Problem: Discharge Planning  Goal: Discharge to home or other facility with appropriate resources  5/9/2023 0844 by Gualberto Cassidy RN  Outcome: Progressing  5/8/2023 2247 by Kasey Marshall RN  Outcome: Progressing     Problem: Pain  Goal: Verbalizes/displays adequate comfort level or baseline comfort level  5/9/2023 0844 by Gualberto Cassidy RN  Outcome: Progressing  5/8/2023 2247 by Kasey Marshall RN  Outcome: Progressing     Problem: Safety - Adult  Goal: Free from fall injury  5/9/2023 0844 by Gualberto Cassidy RN  Outcome: Progressing  5/8/2023 2247 by Kasey Marshall RN  Outcome: Progressing     Problem: Neurosensory - Adult  Goal: Achieves stable or improved neurological status  5/9/2023 0844 by Gualberto Cassidy RN  Outcome: Progressing  5/8/2023 2247 by Kasey Marshall RN  Outcome: Progressing  Goal: Absence of seizures  5/9/2023 0844 by Gualberto Cassidy RN  Outcome: Progressing  5/8/2023 2247 by Kasey Marshall RN  Outcome: Progressing  Goal: Remains free of injury related to seizures activity  5/9/2023 0844 by Gualberto Cassidy RN  Outcome: Progressing  5/8/2023 2247 by Kasey Marshall RN  Outcome: Progressing  Goal: Achieves maximal functionality and self care  5/9/2023 0844 by Gualberto Cassidy RN  Outcome: Progressing  5/8/2023 2247 by Kasey Marshall RN  Outcome: Progressing     Problem: Musculoskeletal - Adult  Goal: Return mobility to safest level of function  5/9/2023 0844 by Gualberto Cassidy RN  Outcome: Progressing  5/8/2023 2247 by Kasey Marshall RN  Outcome: Progressing  Goal: Maintain proper alignment of affected body part  5/9/2023 0844 by Gualberto Cassidy RN  Outcome: Progressing  5/8/2023 2247 by Kasey Marshall RN  Outcome: Progressing  Goal: Return ADL status to a safe level of function  5/9/2023 0844 by Gualberto Cassidy RN  Outcome: Progressing  5/8/2023 2247 by Kasey Marshall RN  Outcome: Progressing

## 2023-05-10 ENCOUNTER — ANESTHESIA EVENT (OUTPATIENT)
Facility: HOSPITAL | Age: 63
End: 2023-05-10
Payer: COMMERCIAL

## 2023-05-10 ENCOUNTER — TELEPHONE (OUTPATIENT)
Age: 63
End: 2023-05-10

## 2023-05-10 LAB — METHYLMALONATE SERPL-SCNC: 339 NMOL/L (ref 0–378)

## 2023-05-10 RX ORDER — IBUPROFEN 200 MG
200 TABLET ORAL EVERY 6 HOURS PRN
COMMUNITY

## 2023-05-10 NOTE — PROGRESS NOTES
[facility]  Preoperative Instructions        Surgery Date 5/12/23            Time of Arrival:  To Be Called  Contact # [phone]    1. On the day of your surgery, please report to the Surgical Services Registration Desk and sign in at your designated time. The Surgery Center is located to the right of the Emergency Room. 2. You must have someone with you to drive you home. You should not drive a car for 24 hours following surgery. Please make arrangements for a friend or family member to stay with you for the first 24 hours after your surgery. 3. Do not have anything to eat or drink (including water, gum, mints, coffee, juice) after midnight ? 5/11/23 . ? This may not apply to medications prescribed by your physician. ?(Please note below the special instructions with medications to take the morning of your procedure.)    4. We recommend you do not drink any alcoholic beverages for 24 hours before and after your surgery. 5. Contact your surgeons office for instructions on the following medications: non-steroidal anti-inflammatory drugs (i.e. Advil, Aleve), vitamins, and supplements. (Some surgeons will want you to stop these medications prior to surgery and others may allow you to take them)  **If you are currently taking Plavix, Coumadin, Aspirin and/or other blood-thinning agents, contact your surgeon for instructions. ** Your surgeon will partner with the physician prescribing these medications to determine if it is safe to stop or if you need to continue taking. Please do not stop taking these medications without instructions from your surgeon    6. Wear comfortable clothes. Wear glasses instead of contacts. Do not bring any money or jewelry. Please bring picture ID, insurance card, and any prearranged co-payment or hospital payment. Do not wear make-up, particularly mascara the morning of your surgery.   Do not wear nail polish, particularly if you are having foot /hand

## 2023-05-10 NOTE — PERIOP NOTE
Office notes dated 1/5/23 and 2/27/23 received from Dr. Gurwinder Graham along with Edda Lee dated 2/20/23, Echo dated 1/30/223, and EKG dated 1/5/23. Copies on paper chart.

## 2023-05-10 NOTE — TELEPHONE ENCOUNTER
Patient called to let Dr Houston Rather know that he is having stints put in on 5/12. Pt is scheduled for a hosp f/u with us on 7/12 and wants to know if he needs to be seen sooner than that due to the stints susi put in. Please advise.  [phone]

## 2023-05-11 NOTE — TELEPHONE ENCOUNTER
Verified patient with 2 identifiers   Advised patient that the it would be fine to wait until July appt. Patient states the only problem with that is he is a . He requests a sooner appt to discuss what he can and cannot do. States he cannot go 6 months without a paycheck. Please advise if we can fit him in a procedure slot.

## 2023-05-11 NOTE — TELEPHONE ENCOUNTER
Verified patient with 2 identifiers   Advised Dr Caroline Redman could see him on 5/30/23 at 3:40 pm. Patient agreed to appt day/time.  Appt changed

## 2023-05-12 ENCOUNTER — APPOINTMENT (OUTPATIENT)
Facility: HOSPITAL | Age: 63
DRG: 036 | End: 2023-05-12
Attending: SURGERY
Payer: COMMERCIAL

## 2023-05-12 ENCOUNTER — ANESTHESIA (OUTPATIENT)
Facility: HOSPITAL | Age: 63
End: 2023-05-12
Payer: COMMERCIAL

## 2023-05-12 ENCOUNTER — HOSPITAL ENCOUNTER (INPATIENT)
Facility: HOSPITAL | Age: 63
LOS: 1 days | Discharge: HOME | DRG: 036 | End: 2023-05-13
Attending: SURGERY | Admitting: SURGERY
Payer: COMMERCIAL

## 2023-05-12 DIAGNOSIS — I63.239 CAROTID STENOSIS, SYMPTOMATIC, WITH INFARCTION (HCC): Primary | ICD-10-CM

## 2023-05-12 LAB
ABO + RH BLD: NORMAL
ACT BLD: 305 SECS (ref 79–138)
APPEARANCE UR: CLEAR
BACTERIA URNS QL MICRO: NEGATIVE /HPF
BILIRUB UR QL: NEGATIVE
BLOOD GROUP ANTIBODIES SERPL: NORMAL
COLOR UR: NORMAL
EPITH CASTS URNS QL MICRO: NORMAL /LPF
GLUCOSE UR STRIP.AUTO-MCNC: NEGATIVE MG/DL
HGB UR QL STRIP: NEGATIVE
HYALINE CASTS URNS QL MICRO: NORMAL /LPF (ref 0–2)
KETONES UR QL STRIP.AUTO: NEGATIVE MG/DL
LEUKOCYTE ESTERASE UR QL STRIP.AUTO: NEGATIVE
NITRITE UR QL STRIP.AUTO: NEGATIVE
PH UR STRIP: 5.5 (ref 5–8)
PROT UR STRIP-MCNC: NEGATIVE MG/DL
RBC #/AREA URNS HPF: NORMAL /HPF (ref 0–5)
SP GR UR REFRACTOMETRY: 1.02
SPECIMEN EXP DATE BLD: NORMAL
URINE CULTURE IF INDICATED: NORMAL
UROBILINOGEN UR QL STRIP.AUTO: 0.2 EU/DL (ref 0.2–1)
WBC URNS QL MICRO: NORMAL /HPF (ref 0–4)

## 2023-05-12 PROCEDURE — 7100000000 HC PACU RECOVERY - FIRST 15 MIN: Performed by: SURGERY

## 2023-05-12 PROCEDURE — 3600000017 HC SURGERY HYBRID ADDL 15MIN: Performed by: SURGERY

## 2023-05-12 PROCEDURE — 36415 COLL VENOUS BLD VENIPUNCTURE: CPT

## 2023-05-12 PROCEDURE — 2580000003 HC RX 258: Performed by: SURGERY

## 2023-05-12 PROCEDURE — C1876 STENT, NON-COA/NON-COV W/DEL: HCPCS | Performed by: SURGERY

## 2023-05-12 PROCEDURE — 85347 COAGULATION TIME ACTIVATED: CPT

## 2023-05-12 PROCEDURE — 86901 BLOOD TYPING SEROLOGIC RH(D): CPT

## 2023-05-12 PROCEDURE — 6360000002 HC RX W HCPCS: Performed by: SURGERY

## 2023-05-12 PROCEDURE — 6360000002 HC RX W HCPCS: Performed by: NURSE ANESTHETIST, CERTIFIED REGISTERED

## 2023-05-12 PROCEDURE — 2500000003 HC RX 250 WO HCPCS: Performed by: NURSE ANESTHETIST, CERTIFIED REGISTERED

## 2023-05-12 PROCEDURE — 6360000004 HC RX CONTRAST MEDICATION: Performed by: SURGERY

## 2023-05-12 PROCEDURE — 72040 X-RAY EXAM NECK SPINE 2-3 VW: CPT

## 2023-05-12 PROCEDURE — 2580000003 HC RX 258: Performed by: NURSE ANESTHETIST, CERTIFIED REGISTERED

## 2023-05-12 PROCEDURE — C1725 CATH, TRANSLUMIN NON-LASER: HCPCS | Performed by: SURGERY

## 2023-05-12 PROCEDURE — 7100000001 HC PACU RECOVERY - ADDTL 15 MIN: Performed by: SURGERY

## 2023-05-12 PROCEDURE — 2060000000 HC ICU INTERMEDIATE R&B

## 2023-05-12 PROCEDURE — 6370000000 HC RX 637 (ALT 250 FOR IP): Performed by: SURGERY

## 2023-05-12 PROCEDURE — 76000 FLUOROSCOPY <1 HR PHYS/QHP: CPT

## 2023-05-12 PROCEDURE — 037K3DZ DILATION OF RIGHT INTERNAL CAROTID ARTERY WITH INTRALUMINAL DEVICE, PERCUTANEOUS APPROACH: ICD-10-PCS | Performed by: SURGERY

## 2023-05-12 PROCEDURE — 81001 URINALYSIS AUTO W/SCOPE: CPT

## 2023-05-12 PROCEDURE — 3700000000 HC ANESTHESIA ATTENDED CARE: Performed by: SURGERY

## 2023-05-12 PROCEDURE — 2580000003 HC RX 258: Performed by: ANESTHESIOLOGY

## 2023-05-12 PROCEDURE — A4217 STERILE WATER/SALINE, 500 ML: HCPCS | Performed by: SURGERY

## 2023-05-12 PROCEDURE — 2720000010 HC SURG SUPPLY STERILE: Performed by: SURGERY

## 2023-05-12 PROCEDURE — 86900 BLOOD TYPING SEROLOGIC ABO: CPT

## 2023-05-12 PROCEDURE — 86850 RBC ANTIBODY SCREEN: CPT

## 2023-05-12 PROCEDURE — 2500000003 HC RX 250 WO HCPCS: Performed by: ANESTHESIOLOGY

## 2023-05-12 PROCEDURE — C1769 GUIDE WIRE: HCPCS | Performed by: SURGERY

## 2023-05-12 PROCEDURE — 2709999900 HC NON-CHARGEABLE SUPPLY: Performed by: SURGERY

## 2023-05-12 PROCEDURE — 3600000007 HC SURGERY HYBRID BASE: Performed by: SURGERY

## 2023-05-12 PROCEDURE — 3700000001 HC ADD 15 MINUTES (ANESTHESIA): Performed by: SURGERY

## 2023-05-12 PROCEDURE — C1894 INTRO/SHEATH, NON-LASER: HCPCS | Performed by: SURGERY

## 2023-05-12 PROCEDURE — C1884 EMBOLIZATION PROTECT SYST: HCPCS | Performed by: SURGERY

## 2023-05-12 DEVICE — 8 MM X 40 MM
Type: IMPLANTABLE DEVICE | Site: CAROTID | Status: FUNCTIONAL
Brand: ENROUTE TRANSCAROTID STENT

## 2023-05-12 RX ORDER — FENTANYL CITRATE 50 UG/ML
INJECTION, SOLUTION INTRAMUSCULAR; INTRAVENOUS PRN
Status: DISCONTINUED | OUTPATIENT
Start: 2023-05-12 | End: 2023-05-12 | Stop reason: SDUPTHER

## 2023-05-12 RX ORDER — SODIUM CHLORIDE 0.9 % (FLUSH) 0.9 %
5-40 SYRINGE (ML) INJECTION EVERY 12 HOURS SCHEDULED
Status: DISCONTINUED | OUTPATIENT
Start: 2023-05-12 | End: 2023-05-12 | Stop reason: HOSPADM

## 2023-05-12 RX ORDER — OXYCODONE HYDROCHLORIDE 5 MG/1
5 TABLET ORAL EVERY 4 HOURS PRN
Status: DISCONTINUED | OUTPATIENT
Start: 2023-05-12 | End: 2023-05-13 | Stop reason: HOSPADM

## 2023-05-12 RX ORDER — SUCCINYLCHOLINE/SOD CL,ISO/PF 200MG/10ML
SYRINGE (ML) INTRAVENOUS PRN
Status: DISCONTINUED | OUTPATIENT
Start: 2023-05-12 | End: 2023-05-12 | Stop reason: SDUPTHER

## 2023-05-12 RX ORDER — ONDANSETRON 2 MG/ML
4 INJECTION INTRAMUSCULAR; INTRAVENOUS EVERY 6 HOURS PRN
Status: DISCONTINUED | OUTPATIENT
Start: 2023-05-12 | End: 2023-05-13 | Stop reason: HOSPADM

## 2023-05-12 RX ORDER — SODIUM CHLORIDE 9 MG/ML
INJECTION, SOLUTION INTRAVENOUS CONTINUOUS
Status: DISCONTINUED | OUTPATIENT
Start: 2023-05-12 | End: 2023-05-13 | Stop reason: HOSPADM

## 2023-05-12 RX ORDER — SODIUM CHLORIDE 0.9 % (FLUSH) 0.9 %
5-40 SYRINGE (ML) INJECTION EVERY 12 HOURS SCHEDULED
Status: DISCONTINUED | OUTPATIENT
Start: 2023-05-12 | End: 2023-05-13 | Stop reason: HOSPADM

## 2023-05-12 RX ORDER — SODIUM CHLORIDE 9 MG/ML
INJECTION, SOLUTION INTRAVENOUS PRN
Status: DISCONTINUED | OUTPATIENT
Start: 2023-05-12 | End: 2023-05-12

## 2023-05-12 RX ORDER — ASPIRIN 81 MG/1
81 TABLET ORAL DAILY
Status: DISCONTINUED | OUTPATIENT
Start: 2023-05-13 | End: 2023-05-13 | Stop reason: HOSPADM

## 2023-05-12 RX ORDER — EPHEDRINE SULFATE/0.9% NACL/PF 50 MG/5 ML
SYRINGE (ML) INTRAVENOUS PRN
Status: DISCONTINUED | OUTPATIENT
Start: 2023-05-12 | End: 2023-05-12 | Stop reason: SDUPTHER

## 2023-05-12 RX ORDER — HEPARIN SODIUM 1000 [USP'U]/ML
INJECTION, SOLUTION INTRAVENOUS; SUBCUTANEOUS PRN
Status: DISCONTINUED | OUTPATIENT
Start: 2023-05-12 | End: 2023-05-12 | Stop reason: SDUPTHER

## 2023-05-12 RX ORDER — DEXAMETHASONE SODIUM PHOSPHATE 4 MG/ML
INJECTION, SOLUTION INTRA-ARTICULAR; INTRALESIONAL; INTRAMUSCULAR; INTRAVENOUS; SOFT TISSUE PRN
Status: DISCONTINUED | OUTPATIENT
Start: 2023-05-12 | End: 2023-05-12 | Stop reason: SDUPTHER

## 2023-05-12 RX ORDER — SODIUM CHLORIDE 0.9 % (FLUSH) 0.9 %
5-40 SYRINGE (ML) INJECTION PRN
Status: DISCONTINUED | OUTPATIENT
Start: 2023-05-12 | End: 2023-05-13 | Stop reason: HOSPADM

## 2023-05-12 RX ORDER — LIDOCAINE HYDROCHLORIDE 20 MG/ML
INJECTION, SOLUTION EPIDURAL; INFILTRATION; INTRACAUDAL; PERINEURAL PRN
Status: DISCONTINUED | OUTPATIENT
Start: 2023-05-12 | End: 2023-05-12 | Stop reason: SDUPTHER

## 2023-05-12 RX ORDER — FENTANYL CITRATE 50 UG/ML
25 INJECTION, SOLUTION INTRAMUSCULAR; INTRAVENOUS EVERY 5 MIN PRN
Status: DISCONTINUED | OUTPATIENT
Start: 2023-05-12 | End: 2023-05-12

## 2023-05-12 RX ORDER — PROPOFOL 10 MG/ML
INJECTION, EMULSION INTRAVENOUS PRN
Status: DISCONTINUED | OUTPATIENT
Start: 2023-05-12 | End: 2023-05-12 | Stop reason: SDUPTHER

## 2023-05-12 RX ORDER — SODIUM CHLORIDE 0.9 % (FLUSH) 0.9 %
5-40 SYRINGE (ML) INJECTION PRN
Status: DISCONTINUED | OUTPATIENT
Start: 2023-05-12 | End: 2023-05-12 | Stop reason: HOSPADM

## 2023-05-12 RX ORDER — ROCURONIUM BROMIDE 10 MG/ML
INJECTION, SOLUTION INTRAVENOUS PRN
Status: DISCONTINUED | OUTPATIENT
Start: 2023-05-12 | End: 2023-05-12 | Stop reason: SDUPTHER

## 2023-05-12 RX ORDER — ACETAMINOPHEN 325 MG/1
650 TABLET ORAL EVERY 4 HOURS PRN
Status: DISCONTINUED | OUTPATIENT
Start: 2023-05-12 | End: 2023-05-13 | Stop reason: HOSPADM

## 2023-05-12 RX ORDER — SODIUM CHLORIDE 0.9 % (FLUSH) 0.9 %
5-40 SYRINGE (ML) INJECTION EVERY 12 HOURS SCHEDULED
Status: DISCONTINUED | OUTPATIENT
Start: 2023-05-12 | End: 2023-05-12

## 2023-05-12 RX ORDER — ROSUVASTATIN CALCIUM 40 MG/1
40 TABLET, COATED ORAL NIGHTLY
Status: DISCONTINUED | OUTPATIENT
Start: 2023-05-12 | End: 2023-05-13 | Stop reason: HOSPADM

## 2023-05-12 RX ORDER — MORPHINE SULFATE 4 MG/ML
2 INJECTION, SOLUTION INTRAMUSCULAR; INTRAVENOUS
Status: DISCONTINUED | OUTPATIENT
Start: 2023-05-12 | End: 2023-05-13 | Stop reason: HOSPADM

## 2023-05-12 RX ORDER — ONDANSETRON 4 MG/1
4 TABLET, ORALLY DISINTEGRATING ORAL EVERY 8 HOURS PRN
Status: DISCONTINUED | OUTPATIENT
Start: 2023-05-12 | End: 2023-05-13 | Stop reason: HOSPADM

## 2023-05-12 RX ORDER — PROTAMINE SULFATE 10 MG/ML
INJECTION, SOLUTION INTRAVENOUS PRN
Status: DISCONTINUED | OUTPATIENT
Start: 2023-05-12 | End: 2023-05-12 | Stop reason: SDUPTHER

## 2023-05-12 RX ORDER — CLOPIDOGREL BISULFATE 75 MG/1
75 TABLET ORAL DAILY
Status: DISCONTINUED | OUTPATIENT
Start: 2023-05-13 | End: 2023-05-13 | Stop reason: HOSPADM

## 2023-05-12 RX ORDER — SODIUM CHLORIDE, SODIUM LACTATE, POTASSIUM CHLORIDE, CALCIUM CHLORIDE 600; 310; 30; 20 MG/100ML; MG/100ML; MG/100ML; MG/100ML
INJECTION, SOLUTION INTRAVENOUS CONTINUOUS
Status: DISCONTINUED | OUTPATIENT
Start: 2023-05-12 | End: 2023-05-12 | Stop reason: HOSPADM

## 2023-05-12 RX ORDER — SODIUM CHLORIDE 0.9 % (FLUSH) 0.9 %
5-40 SYRINGE (ML) INJECTION PRN
Status: DISCONTINUED | OUTPATIENT
Start: 2023-05-12 | End: 2023-05-12

## 2023-05-12 RX ORDER — PROCHLORPERAZINE EDISYLATE 5 MG/ML
5 INJECTION INTRAMUSCULAR; INTRAVENOUS
Status: DISCONTINUED | OUTPATIENT
Start: 2023-05-12 | End: 2023-05-12

## 2023-05-12 RX ORDER — GLYCOPYRROLATE 0.2 MG/ML
INJECTION INTRAMUSCULAR; INTRAVENOUS PRN
Status: DISCONTINUED | OUTPATIENT
Start: 2023-05-12 | End: 2023-05-12 | Stop reason: SDUPTHER

## 2023-05-12 RX ORDER — MIDAZOLAM HYDROCHLORIDE 1 MG/ML
INJECTION INTRAMUSCULAR; INTRAVENOUS PRN
Status: DISCONTINUED | OUTPATIENT
Start: 2023-05-12 | End: 2023-05-12 | Stop reason: SDUPTHER

## 2023-05-12 RX ORDER — HYDROMORPHONE HYDROCHLORIDE 1 MG/ML
0.5 INJECTION, SOLUTION INTRAMUSCULAR; INTRAVENOUS; SUBCUTANEOUS EVERY 5 MIN PRN
Status: COMPLETED | OUTPATIENT
Start: 2023-05-12 | End: 2023-05-12

## 2023-05-12 RX ORDER — OXYCODONE HYDROCHLORIDE 5 MG/1
5 TABLET ORAL
Status: DISCONTINUED | OUTPATIENT
Start: 2023-05-12 | End: 2023-05-12

## 2023-05-12 RX ORDER — ONDANSETRON 2 MG/ML
INJECTION INTRAMUSCULAR; INTRAVENOUS PRN
Status: DISCONTINUED | OUTPATIENT
Start: 2023-05-12 | End: 2023-05-12 | Stop reason: SDUPTHER

## 2023-05-12 RX ORDER — SODIUM CHLORIDE 9 MG/ML
INJECTION, SOLUTION INTRAVENOUS PRN
Status: DISCONTINUED | OUTPATIENT
Start: 2023-05-12 | End: 2023-05-12 | Stop reason: HOSPADM

## 2023-05-12 RX ORDER — SODIUM CHLORIDE 9 MG/ML
INJECTION, SOLUTION INTRAVENOUS PRN
Status: DISCONTINUED | OUTPATIENT
Start: 2023-05-12 | End: 2023-05-13 | Stop reason: HOSPADM

## 2023-05-12 RX ORDER — ONDANSETRON 2 MG/ML
4 INJECTION INTRAMUSCULAR; INTRAVENOUS
Status: DISCONTINUED | OUTPATIENT
Start: 2023-05-12 | End: 2023-05-12

## 2023-05-12 RX ADMIN — PROTAMINE SULFATE 25 MG: 10 INJECTION, SOLUTION INTRAVENOUS at 14:07

## 2023-05-12 RX ADMIN — DEXAMETHASONE SODIUM PHOSPHATE 4 MG: 4 INJECTION, SOLUTION INTRAMUSCULAR; INTRAVENOUS at 12:54

## 2023-05-12 RX ADMIN — LIDOCAINE HYDROCHLORIDE 60 MG: 20 INJECTION, SOLUTION EPIDURAL; INFILTRATION; INTRACAUDAL; PERINEURAL at 12:44

## 2023-05-12 RX ADMIN — ROCURONIUM BROMIDE 45 MG: 10 INJECTION INTRAVENOUS at 12:52

## 2023-05-12 RX ADMIN — GLYCOPYRROLATE 0.2 MG: 0.2 INJECTION, SOLUTION INTRAMUSCULAR; INTRAVENOUS at 13:16

## 2023-05-12 RX ADMIN — OXYCODONE HYDROCHLORIDE 5 MG: 5 TABLET ORAL at 23:14

## 2023-05-12 RX ADMIN — PHENYLEPHRINE HYDROCHLORIDE 25 MCG/MIN: 10 INJECTION INTRAVENOUS at 12:44

## 2023-05-12 RX ADMIN — SODIUM CHLORIDE: 9 INJECTION, SOLUTION INTRAVENOUS at 16:31

## 2023-05-12 RX ADMIN — ROCURONIUM BROMIDE 5 MG: 10 INJECTION INTRAVENOUS at 12:44

## 2023-05-12 RX ADMIN — Medication 10 MG: at 13:13

## 2023-05-12 RX ADMIN — PROPOFOL 100 MG: 10 INJECTION, EMULSION INTRAVENOUS at 12:44

## 2023-05-12 RX ADMIN — OXYCODONE HYDROCHLORIDE 5 MG: 5 TABLET ORAL at 16:33

## 2023-05-12 RX ADMIN — Medication 3000 MG: at 12:47

## 2023-05-12 RX ADMIN — SODIUM CHLORIDE, PRESERVATIVE FREE 10 ML: 5 INJECTION INTRAVENOUS at 21:00

## 2023-05-12 RX ADMIN — ROSUVASTATIN CALCIUM 40 MG: 40 TABLET, FILM COATED ORAL at 20:59

## 2023-05-12 RX ADMIN — SUGAMMADEX 200 MG: 100 INJECTION, SOLUTION INTRAVENOUS at 14:13

## 2023-05-12 RX ADMIN — ONDANSETRON HYDROCHLORIDE 4 MG: 2 INJECTION, SOLUTION INTRAMUSCULAR; INTRAVENOUS at 13:57

## 2023-05-12 RX ADMIN — HEPARIN SODIUM 12000 UNITS: 1000 INJECTION, SOLUTION INTRAVENOUS; SUBCUTANEOUS at 13:20

## 2023-05-12 RX ADMIN — FENTANYL CITRATE 50 MCG: 50 INJECTION, SOLUTION INTRAMUSCULAR; INTRAVENOUS at 13:16

## 2023-05-12 RX ADMIN — HYDROMORPHONE HYDROCHLORIDE 0.5 MG: 1 INJECTION, SOLUTION INTRAMUSCULAR; INTRAVENOUS; SUBCUTANEOUS at 15:30

## 2023-05-12 RX ADMIN — Medication 160 MG: at 12:44

## 2023-05-12 RX ADMIN — SODIUM CHLORIDE, POTASSIUM CHLORIDE, SODIUM LACTATE AND CALCIUM CHLORIDE 25 ML: 600; 310; 30; 20 INJECTION, SOLUTION INTRAVENOUS at 11:20

## 2023-05-12 RX ADMIN — HYDROMORPHONE HYDROCHLORIDE 0.5 MG: 1 INJECTION, SOLUTION INTRAMUSCULAR; INTRAVENOUS; SUBCUTANEOUS at 15:00

## 2023-05-12 RX ADMIN — MIDAZOLAM HYDROCHLORIDE 2 MG: 1 INJECTION, SOLUTION INTRAMUSCULAR; INTRAVENOUS at 12:42

## 2023-05-12 RX ADMIN — Medication 10 MG: at 13:08

## 2023-05-12 RX ADMIN — Medication 3 AMPULE: at 11:20

## 2023-05-12 RX ADMIN — FENTANYL CITRATE 50 MCG: 50 INJECTION, SOLUTION INTRAMUSCULAR; INTRAVENOUS at 13:15

## 2023-05-12 RX ADMIN — GLYCOPYRROLATE 0.2 MG: 0.2 INJECTION, SOLUTION INTRAMUSCULAR; INTRAVENOUS at 12:50

## 2023-05-12 ASSESSMENT — PAIN DESCRIPTION - ONSET: ONSET: ON-GOING

## 2023-05-12 ASSESSMENT — PAIN DESCRIPTION - ORIENTATION
ORIENTATION: RIGHT

## 2023-05-12 ASSESSMENT — PAIN SCALES - GENERAL
PAINLEVEL_OUTOF10: 0
PAINLEVEL_OUTOF10: 5
PAINLEVEL_OUTOF10: 7
PAINLEVEL_OUTOF10: 6
PAINLEVEL_OUTOF10: 4

## 2023-05-12 ASSESSMENT — PAIN DESCRIPTION - LOCATION
LOCATION: NECK

## 2023-05-12 ASSESSMENT — PAIN DESCRIPTION - DESCRIPTORS
DESCRIPTORS: SORE
DESCRIPTORS: DULL
DESCRIPTORS: SORE
DESCRIPTORS: ACHING

## 2023-05-12 ASSESSMENT — PAIN - FUNCTIONAL ASSESSMENT: PAIN_FUNCTIONAL_ASSESSMENT: 0-10

## 2023-05-12 ASSESSMENT — LIFESTYLE VARIABLES: SMOKING_STATUS: 1

## 2023-05-12 ASSESSMENT — PAIN DESCRIPTION - PAIN TYPE: TYPE: SURGICAL PAIN

## 2023-05-12 NOTE — ANESTHESIA PRE PROCEDURE
Department of Anesthesiology  Preprocedure Note       Name:  Bette Rodriguez   Age:  58 y.o.  :  1960                                          MRN:  [id number]         Date:  2023      Surgeon: Trisha Perez): Soni Figueroa MD    Procedure: Procedure(s):  RIGHT TRANSCAROTID ARTERY REVASCULARIZATION    Medications prior to admission:   Prior to Admission medications    Medication Sig Start Date End Date Taking? Authorizing Provider   Famotidine (PEPCID AC PO) Take by mouth daily   Yes Historical Provider, MD   ibuprofen (ADVIL;MOTRIN) 200 MG tablet Take 1 tablet by mouth every 6 hours as needed for Pain   Yes Historical Provider, MD   aspirin 81 MG EC tablet Take 1 tablet by mouth daily 5/10/23   Edwardo Naranjo MD   rosuvastatin (CRESTOR) 40 MG tablet Take 1 tablet by mouth nightly 23   Edwardo Naranjo MD   clopidogrel (PLAVIX) 75 MG tablet Take 1 tablet by mouth daily for 30 doses 5/10/23 6/9/23  Edwardo Naranjo MD   nicotine ([place]) 21 MG/24HR Place 1 patch onto the skin daily as needed (nicotine cravings) 23   Edwardo Naranjo MD       Current medications:    No current facility-administered medications for this encounter.      Current Outpatient Medications   Medication Sig Dispense Refill    Famotidine (PEPCID AC PO) Take by mouth daily      ibuprofen (ADVIL;MOTRIN) 200 MG tablet Take 1 tablet by mouth every 6 hours as needed for Pain      aspirin 81 MG EC tablet Take 1 tablet by mouth daily 30 tablet 0    rosuvastatin (CRESTOR) 40 MG tablet Take 1 tablet by mouth nightly 30 tablet 0    clopidogrel (PLAVIX) 75 MG tablet Take 1 tablet by mouth daily for 30 doses 30 tablet 0    nicotine (NICODERM CQ) 21 MG/24HR Place 1 patch onto the skin daily as needed (nicotine cravings) 30 patch 0       Allergies:  No Known Allergies    Problem List:    Patient Active Problem List   Diagnosis Code    CVA (cerebrovascular accident due to intracerebral hemorrhage) ([facility]) [address]       Past

## 2023-05-12 NOTE — PERIOP NOTE
No covid test   no s/s/      mepiex sacrum border preventatively applied   skin intact. Pt  alert and oriented. Speech   clear  pupils  deferred. Right  and left temporal pulse palp  present     smile and  tongue pertrussion   symmetrical    right and left radial pulse  palp. Brisk cap refill  right and left dp and pt pulse palp. Strong   foot   pushes. Oriented   x4  . Stat   ua   and   culture   and type and screen done   as ordered  results  pending.

## 2023-05-12 NOTE — ANESTHESIA POSTPROCEDURE EVALUATION
Department of Anesthesiology  Postprocedure Note    Patient: Nancy Blair  MRN: [id number]  YOB: 1960  Date of evaluation: 5/12/2023      Procedure Summary     Date: 05/12/23 Room / Location: [facility] MAIN OR 0 / [facility] MAIN OR    Anesthesia Start: 1242 Anesthesia Stop: 1440    Procedure: RIGHT TRANSCAROTID ARTERY REVASCULARIZATION (Neck) Diagnosis:       Bilateral carotid artery stenosis      (Bilateral carotid artery stenosis [I65.23])    Providers: Mabel Pickett MD Responsible Provider: Lisandra Lopez MD    Anesthesia Type: General ASA Status: 3          Anesthesia Type: General    Gabby Phase I: Gabby Score: 10    Gabby Phase II:        Anesthesia Post Evaluation    Patient location during evaluation: PACU  Patient participation: complete - patient participated  Level of consciousness: sleepy but conscious and responsive to verbal stimuli  Pain score: 2  Airway patency: patent  Nausea & Vomiting: no vomiting and no nausea  Complications: no  Cardiovascular status: blood pressure returned to baseline and hemodynamically stable  Respiratory status: acceptable  Hydration status: stable

## 2023-05-12 NOTE — ANESTHESIA PROCEDURE NOTES
Arterial Line:    An arterial line was placed using ultrasound guidance, in the OR for the following indication(s): continuous blood pressure monitoring and blood sampling needed. A 22 gauge (size), 1 and 3/4 inch (length), Arrow (type) catheter was placed, Seldinger technique used, into the radial artery, secured by Tegaderm and tape. Anesthesia type: Local  Local infiltration: Injection    Events:  patient tolerated procedure well with no complications and EBL < 5mL. 5/12/2023 12:50 PM5/12/2023 12:53 PM  Anesthesiologist: Jennifer Adorno MD  Performed: Anesthesiologist   Preanesthetic Checklist  Completed: patient identified, IV checked, site marked, risks and benefits discussed, surgical/procedural consents, equipment checked, pre-op evaluation, timeout performed, anesthesia consent given, oxygen available, monitors applied/VS acknowledged, fire risk safety assessment completed and verbalized and blood product R/B/A discussed and consented

## 2023-05-12 NOTE — H&P
History and Physical    Subjective:     Darryl Nova is a 58 y.o. male who has a severe right ICA stenosis and had a recent stroke. He was discharged from the stroke admission a few days ago with a plan to return today for Rt TCAR for his symptomatic carotid stenosis. Past Medical History:   Diagnosis Date    Anxiety     At risk for sleep apnea     PCP previously discussed with patient    Cerebral artery occlusion with cerebral infarction ([address] 2023    GERD (gastroesophageal reflux disease)       Past Surgical History:   Procedure Laterality Date    TONSILLECTOMY      as a child     Family History   Problem Relation Age of Onset    Diabetes Sister     Allergy (Severe) Sister         penicillin    Diabetes Maternal Grandmother       Social History     Tobacco Use    Smoking status: Every Day     Packs/day: 1.00     Years: 40.00     Pack years: 40.00     Types: Cigarettes    Smokeless tobacco: Never    Tobacco comments:     Reduced to a few cigarettes per day as of 5/6/23   Substance Use Topics    Alcohol use: Yes     Comment: special occassions       Prior to Admission medications    Medication Sig Start Date End Date Taking?  Authorizing Provider   Famotidine (PEPCID AC PO) Take by mouth daily   Yes Historical Provider, MD   ibuprofen (ADVIL;MOTRIN) 200 MG tablet Take 1 tablet by mouth every 6 hours as needed for Pain   Yes Historical Provider, MD   aspirin 81 MG EC tablet Take 1 tablet by mouth daily 5/10/23   Reji Rawls MD   rosuvastatin (CRESTOR) 40 MG tablet Take 1 tablet by mouth nightly 5/9/23   Reji Rawls MD   clopidogrel (PLAVIX) 75 MG tablet Take 1 tablet by mouth daily for 30 doses 5/10/23 6/9/23  Reji Rawls MD   nicotine (NICODERM CQ) 21 MG/24HR Place 1 patch onto the skin daily as needed (nicotine cravings)  Patient not taking: Reported on 5/12/2023 5/9/23   Reji Rawsl MD     No Known Allergies     Review of Systems:  Denies CP/SOB    Objective:     Physical Exam:   /75

## 2023-05-12 NOTE — BRIEF OP NOTE
Brief Postoperative Note      Patient: January Sanders  YOB: 1960  MRN: [id number]    Date of Procedure: 5/12/2023    Pre-Op Diagnosis Codes:     * Bilateral carotid artery stenosis [I65.23]    Post-Op Diagnosis: Same       Procedure(s):  RIGHT TRANSCAROTID ARTERY REVASCULARIZATION    Surgeon(s): Acacia German MD    Assistant:  * No surgical staff found *    Anesthesia: General    Estimated Blood Loss (mL): less than 929     Complications: None    Specimens:   * No specimens in log *    Implants:  Implant Name Type Inv. Item Serial No.  Lot No. LRB No. Used Action   STENT CAR 0.014 IN 8X40 MM 5 [id number] CM ENROUTE - SN/A Carotid stents STENT CAR 0.014 IN 8X40 MM 5 [id number] CM ENROUTE N/A [facility] [id number] Right [id number] Implanted         Drains: * No LDAs found *    Findings: Severe Rt ICA stenosis. PTA w/ 4x30, stent w/ 8x40, PTA w/ 5x30. Good result.       Electronically signed by Laura Cramer MD on 5/12/2023 at 2:34 PM

## 2023-05-13 VITALS
SYSTOLIC BLOOD PRESSURE: 120 MMHG | HEART RATE: 82 BPM | DIASTOLIC BLOOD PRESSURE: 80 MMHG | RESPIRATION RATE: 16 BRPM | HEIGHT: 71 IN | WEIGHT: 268.52 LBS | BODY MASS INDEX: 37.59 KG/M2 | TEMPERATURE: 98.1 F | OXYGEN SATURATION: 98 %

## 2023-05-13 LAB
ANION GAP SERPL CALC-SCNC: 4 MMOL/L (ref 5–15)
BUN SERPL-MCNC: 17 MG/DL (ref 6–20)
BUN/CREAT SERPL: 16 (ref 12–20)
CALCIUM SERPL-MCNC: 8.8 MG/DL (ref 8.5–10.1)
CHLORIDE SERPL-SCNC: 109 MMOL/L (ref 97–108)
CO2 SERPL-SCNC: 25 MMOL/L (ref 21–32)
CREAT SERPL-MCNC: 1.04 MG/DL (ref 0.7–1.3)
GLUCOSE SERPL-MCNC: 139 MG/DL (ref 65–100)
POTASSIUM SERPL-SCNC: 4.3 MMOL/L (ref 3.5–5.1)
SODIUM SERPL-SCNC: 138 MMOL/L (ref 136–145)

## 2023-05-13 PROCEDURE — 80048 BASIC METABOLIC PNL TOTAL CA: CPT

## 2023-05-13 PROCEDURE — 36415 COLL VENOUS BLD VENIPUNCTURE: CPT

## 2023-05-13 PROCEDURE — 2580000003 HC RX 258: Performed by: SURGERY

## 2023-05-13 PROCEDURE — 6370000000 HC RX 637 (ALT 250 FOR IP): Performed by: SURGERY

## 2023-05-13 RX ORDER — OXYCODONE HYDROCHLORIDE 5 MG/1
5 TABLET ORAL EVERY 4 HOURS PRN
Qty: 10 TABLET | Refills: 0 | Status: SHIPPED | OUTPATIENT
Start: 2023-05-13 | End: 2023-05-16

## 2023-05-13 RX ADMIN — CLOPIDOGREL BISULFATE 75 MG: 75 TABLET ORAL at 08:58

## 2023-05-13 RX ADMIN — ASPIRIN 81 MG: 81 TABLET, COATED ORAL at 08:58

## 2023-05-13 RX ADMIN — SODIUM CHLORIDE, PRESERVATIVE FREE 10 ML: 5 INJECTION INTRAVENOUS at 08:59

## 2023-05-13 ASSESSMENT — PAIN SCALES - GENERAL: PAINLEVEL_OUTOF10: 0

## 2023-05-13 NOTE — PROGRESS NOTES
0730: Bedside shift report received from KALEY Buck.    0800: Assessment complete. See flowsheets. 1026: Dr. Ariana Dietz at bedside. Plan for DC today. 1200: Reassessment complete. No changes at this time. 1255: Pt discharged. Education provided. LDAs removed. Pt wheeled to pickup.

## 2023-05-13 NOTE — PLAN OF CARE
Problem: ABCDS Injury Assessment  Goal: Absence of physical injury  Outcome: Completed     Problem: Pain  Goal: Verbalizes/displays adequate comfort level or baseline comfort level  Outcome: Completed

## 2023-05-13 NOTE — CARE COORDINATION
11:00am- CM met with pt at bedside to discuss d/c plan. CM inquired with pt about any needs. No new needs. Pt stated that his friend will transport at d/c.        05/13/23 [facility] Discharge   Transition of Care Consult (CM Consult) Other   Services At/After Discharge None  (Home)   [address] Provided?  No   Mode of Transport at Discharge Other (see comment)  (Pt's friend will transport at d/c.)   Confirm Follow Up Transport Self       Hyac[address]  [facility]  [phone]

## 2023-05-13 NOTE — PROGRESS NOTES
1900: Bedside shift change report given to Perez Marshall (oncoming nurse) by Anali Langford RN (offgoing nurse). Report included the following information Nurse Handoff Report, Intake/Output, MAR, and Cardiac Rhythm NSR . End of Shift Note    Bedside shift change report given to [address] [place] [address] [address] (oncoming nurse) by Sophia Pearce RN (offgoing nurse). Report included the following information SBAR, Kardex, ED Summary, Intake/Output, MAR, Recent Results, and Cardiac Rhythm NSR    Shift worked:  [id number]     Shift summary and any significant changes:     No acute changes overnight     Concerns for physician to address:  None at this time     [facility]          Activity:     Number times ambulated in hallways past shift: 0  Number of times OOB to chair past shift: 0    Cardiac:   Cardiac Monitoring: Yes           Access:  Current line(s): PIV     Genitourinary:   Urinary status: voiding    Respiratory:      Chronic home O2 use?: NO  Incentive spirometer at bedside: NO       GI:     Current diet:  ADULT DIET; Regular  Passing flatus: YES  Tolerating current diet: YES       Pain Management:   Patient states pain is manageable on current regimen: YES    Skin:     Interventions: float heels and increase time out of bed    Patient Safety:  Fall Score:    Interventions: bed/chair alarm, assistive device (walker, cane.  etc), gripper socks, and pt to call before getting OOB       Length of Stay:  Expected LOS: 2  Actual LOS: [address] Doni Cruz RN

## 2023-05-16 LAB
VAS LEFT CCA DIST EDV: 38.8 CM/S
VAS LEFT CCA DIST PSV: 92.8 CM/S
VAS LEFT CCA PROX EDV: 37.6 CM/S
VAS LEFT CCA PROX PSV: 99 CM/S
VAS LEFT ECA EDV: 25.3 CM/S
VAS LEFT ECA PSV: 119.8 CM/S
VAS LEFT ICA DIST EDV: 42.5 CM/S
VAS LEFT ICA DIST PSV: 87.9 CM/S
VAS LEFT ICA MID EDV: 42.5 CM/S
VAS LEFT ICA MID PSV: 84.2 CM/S
VAS LEFT ICA PROX EDV: 32.7 CM/S
VAS LEFT ICA PROX PSV: 71.9 CM/S
VAS LEFT ICA/CCA PSV: 0.9 NO UNITS
VAS LEFT SUBCLAVIAN PROX EDV: 0 CM/S
VAS LEFT SUBCLAVIAN PROX PSV: 79.3 CM/S
VAS LEFT VERTEBRAL EDV: 19.2 CM/S
VAS LEFT VERTEBRAL PSV: 40 CM/S
VAS RIGHT CCA DIST EDV: 22 CM/S
VAS RIGHT CCA DIST PSV: 52 CM/S
VAS RIGHT CCA PROX EDV: 27.3 CM/S
VAS RIGHT CCA PROX PSV: 83.3 CM/S
VAS RIGHT ECA EDV: 27.7 CM/S
VAS RIGHT ECA PSV: 207.8 CM/S
VAS RIGHT ICA DIST EDV: 51.9 CM/S
VAS RIGHT ICA DIST PSV: 126.1 CM/S
VAS RIGHT ICA MID EDV: 41.3 CM/S
VAS RIGHT ICA MID PSV: 114.4 CM/S
VAS RIGHT ICA PROX EDV: 204.5 CM/S
VAS RIGHT ICA PROX PSV: 361.5 CM/S
VAS RIGHT ICA/CCA PSV: 6.9 NO UNITS
VAS RIGHT SUBCLAVIAN PROX EDV: 0 CM/S
VAS RIGHT SUBCLAVIAN PROX PSV: 56 CM/S
VAS RIGHT VERTEBRAL EDV: 13 CM/S
VAS RIGHT VERTEBRAL PSV: 37.6 CM/S

## 2023-05-18 NOTE — DISCHARGE SUMMARY
Physician Discharge Summary     Patient ID:  Amira Roberts  [id number]  25 y.o.  1960    Admit Date: 5/12/2023    Discharge Date: 5/13/2023    Admission Diagnoses: Bilateral carotid artery stenosis [I65.23]  Carotid stenosis, symptomatic, with infarction [facility]) [I63.239]    Discharge Diagnoses:  Principal Problem:    Carotid stenosis, symptomatic, with infarction [facility])  Resolved Problems:    * No resolved hospital problems. *       Last Procedure: Procedure(s):  RIGHT TRANSCAROTID ARTERY REVASCULARIZATION      Hospital Course:   Normal hospital course for this procedure. Patient Instructions:   Discharge Medication List as of 5/13/2023 11:19 AM        START taking these medications    Details   oxyCODONE (ROXICODONE) 5 MG immediate release tablet Take 1 tablet by mouth every 4 hours as needed for Pain for up to 3 days.  Max Daily Amount: 30 mg, Disp-10 tablet, R-0Normal           CONTINUE these medications which have NOT CHANGED    Details   Famotidine (PEPCID AC PO) Take by mouth dailyHistorical Med      ibuprofen (ADVIL;MOTRIN) 200 MG tablet Take 1 tablet by mouth every 6 hours as needed for PainHistorical Med      aspirin 81 MG EC tablet Take 1 tablet by mouth daily, Disp-30 tablet, R-0Normal      rosuvastatin (CRESTOR) 40 MG tablet Take 1 tablet by mouth nightly, Disp-30 tablet, R-0Normal      clopidogrel (PLAVIX) 75 MG tablet Take 1 tablet by mouth daily for 30 doses, Disp-30 tablet, R-0Normal      nicotine (NICODERM CQ) 21 MG/24HR Place 1 patch onto the skin daily as needed (nicotine cravings), Disp-30 patch, R-0Normal             Follow-up with Marin Wallace MD      Signed:  Marin Wallace MD  5/17/2023  9:03 PM

## 2023-05-19 NOTE — OP NOTE
[address]  OPERATIVE REPORT    Name:  Yeimy Cespedes  MR#:  [id number]  :  1960  ACCOUNT #:  [de-identified]  DATE OF SERVICE:  2023      PREOPERATIVE DIAGNOSIS:  Right carotid artery stenosis with stroke. POSTOPERATIVE DIAGNOSIS:  Right carotid artery stenosis with stroke. PROCEDURE PERFORMED:  Right transcarotid artery revascularization. SURGEON:  Dilma Kearns MD    ASSISTANT:  None. ANESTHESIA:  General.    COMPLICATIONS:  None. SPECIMENS REMOVED:  None. IMPLANTS:  8 x 40 ENROUTE carotid stent by [place] .    ESTIMATED BLOOD LOSS:  Less than 100 mL. DRAINS:  None. INDICATIONS:  The patient is a 41-year-old male who approximately 1 week prior to this procedure had a right hemispheric stroke. He has a severe right internal carotid artery stenosis. He is on dual antiplatelet therapy and on statin and presents for right transcarotid artery revascularization. PROCEDURE:  After informed consent was obtained, the patient was given intravenous antibiotics within 1 hour of the incision. He was taken to the operating room and after induction of adequate general anesthesia, the right neck and both groins were prepped and draped as a sterile field. Under real-time ultrasound guidance, the left common femoral vein was cannulated using a micropuncture technique and the venous return sheath was inserted and secured with a silk stitch. The sheath was flushed with heparinized saline. A transverse incision was made at the base of the right neck just above the clavicle overlying the heads of the sternomastoid muscle. The platysma was divided with electrocautery. Dissection was carried down between the heads of the sternomastoid muscle and the common carotid artery was dissected free and encircled proximally with a vessel loop taking care to avoid injury to the vagus nerve.   The patient was systemically heparinized to maintain an ACT of greater than

## 2023-05-31 ENCOUNTER — OFFICE VISIT (OUTPATIENT)
Age: 63
End: 2023-05-31
Payer: COMMERCIAL

## 2023-05-31 VITALS
RESPIRATION RATE: 18 BRPM | BODY MASS INDEX: 37.69 KG/M2 | HEIGHT: 71 IN | HEART RATE: 90 BPM | WEIGHT: 269.2 LBS | OXYGEN SATURATION: 97 % | TEMPERATURE: 98 F | SYSTOLIC BLOOD PRESSURE: 114 MMHG | DIASTOLIC BLOOD PRESSURE: 74 MMHG

## 2023-05-31 DIAGNOSIS — I61.9 CVA (CEREBROVASCULAR ACCIDENT DUE TO INTRACEREBRAL HEMORRHAGE) (HCC): Primary | ICD-10-CM

## 2023-05-31 PROCEDURE — 99215 OFFICE O/P EST HI 40 MIN: CPT | Performed by: INTERNAL MEDICINE

## 2023-05-31 ASSESSMENT — PATIENT HEALTH QUESTIONNAIRE - PHQ9
SUM OF ALL RESPONSES TO PHQ QUESTIONS 1-9: 0
1. LITTLE INTEREST OR PLEASURE IN DOING THINGS: 0
SUM OF ALL RESPONSES TO PHQ QUESTIONS 1-9: 0
SUM OF ALL RESPONSES TO PHQ9 QUESTIONS 1 & 2: 0
2. FEELING DOWN, DEPRESSED OR HOPELESS: 0
SUM OF ALL RESPONSES TO PHQ QUESTIONS 1-9: 0
SUM OF ALL RESPONSES TO PHQ QUESTIONS 1-9: 0

## 2023-05-31 NOTE — PROGRESS NOTES
Chief Complaint   Patient presents with    Follow-Up from [facility] from 5/6-5/9/23 for CVA- has to focus on left leg when waling - also working of reaction time   Continues with numbness in left shoulder and left leg - felt before the stroke      1. Have you been to the ER, urgent care clinic since your last visit? Hospitalized since your last visit? No     2. Have you seen or consulted any other health care providers outside of the [address] [place] [address] since your last visit? Include any pap smears or colon screening.  No
getting better. He decided to come to the ER to be evaluated. Last week he also had similar symptoms however it went away and was not as severe within several minutes. He does admit to smoking tobacco.  Denies any alcohol or drug use. He does not use any aspirin at home. He has never had a history of stroke or TIA other than last week. He has no known history of A-fib. Currently he states that his symptoms are slowly improving but he does still have left-sided numbness compared to the right. Denies any weakness. Denies any headaches, vision changes, speech or language difficulty. Denies any dizziness. \"     Interval events: The patient had an MRI which showed right frontoparietal infarct. CTA showed right ICA severe stenosis of 70%. Vascular surgery saw the patient and recommended outpatient TCAR which was performed on 5/13. Patient did have a TTE in the hospital which was normal.  Patient was on high intensity statin with an LDL of 129. Hemoglobin A1c was 5.7%. Was started on DAPT and continues on DAPT. Today he states that he has been doing well without any recurrence of symptoms or any new neurological issues.       Past Medical History:   Diagnosis Date    Anxiety     At risk for sleep apnea     PCP previously discussed with patient    Cerebral artery occlusion with cerebral infarction [facility] 2023    GERD (gastroesophageal reflux disease)         Past Surgical History:   Procedure Laterality Date    CAROTID STENT PLACEMENT N/A 5/12/2023    RIGHT TRANSCAROTID ARTERY REVASCULARIZATION performed by Angelique Gould MD at [address]      as a child        Family History   Problem Relation Age of Onset    Diabetes Sister     Allergy (Severe) Sister         penicillin    Diabetes Maternal Grandmother         Social History     Tobacco Use    Smoking status: Every Day     Packs/day: 1.00     Years: 40.00     Pack years: 40.00     Types: Cigarettes    Smokeless tobacco:

## 2023-06-05 ENCOUNTER — HOSPITAL ENCOUNTER (OUTPATIENT)
Facility: HOSPITAL | Age: 63
Setting detail: RECURRING SERIES
Discharge: HOME | End: 2023-06-08
Payer: COMMERCIAL

## 2023-06-05 PROCEDURE — 97162 PT EVAL MOD COMPLEX 30 MIN: CPT

## 2023-06-05 NOTE — PROGRESS NOTES
History:89518}; Examination:  {PT OP Examination:1747121130} ; Presentation:  {PT OP Presentation:63260} ; Clinical Decision Making:  {PT OP Decision Makin} Overall Complexity Rating: {PT OP Complexity:3423011033}  Problem List: {IN MOTION PT PROBLEM LIST:21348}   Treatment Plan may include any combination of the following: {InMotion POC Tx Plan:12584:a}  Patient / Family readiness to learn indicated by: {Outpt PT Patient Family Readiness to Learn:54606}  Persons(s) to be included in education: {IN MOTION PT PERSONS EDUCATION:94653}  Barriers to Learning/Limitations: {Jose PT Barriers to Learning List:35858}  Measures taken if barriers to learning present: ***  Patient Self Reported Health Status: {Outpt PT rehab Potential:99712}  Rehabilitation Potential: {Outpt PT rehab Potential:04998}    Short Term Goals: To be accomplished in *** {BSHSI OP WEEKS/TREATMENTS:76434}  ***  Long Term Goals: To be accomplished in *** {BSHSI OP WEEKS/TREATMENTS:40336}  ***    Frequency / Duration: Patient to be seen *** times per week for *** {BSHSI OP WEEKS/TREATMENTS:82136}    Patient/ Caregiver education and instruction: Diagnosis, prognosis, {Outpt PT patient caregiver ed.:01567}   [x]  Plan of care has been reviewed with TAE Alvarado, PT       2023       12:13 PM        ===================================================================  I certify that the above Therapy Services are being furnished while the patient is under my care. I agree with the treatment plan and certify that this therapy is necessary. [de-identified] Signature:_________________________   DATE:_________   TIME:________                           Trina العلي DO    ** Signature, Date and Time must be completed for valid certification **  Please sign and fax to [phone].   Thank you

## 2023-06-06 ENCOUNTER — HOSPITAL ENCOUNTER (EMERGENCY)
Facility: HOSPITAL | Age: 63
Discharge: HOME | End: 2023-06-06
Payer: COMMERCIAL

## 2023-06-06 ENCOUNTER — APPOINTMENT (OUTPATIENT)
Facility: HOSPITAL | Age: 63
End: 2023-06-06
Payer: COMMERCIAL

## 2023-06-06 VITALS
DIASTOLIC BLOOD PRESSURE: 91 MMHG | TEMPERATURE: 98 F | RESPIRATION RATE: 18 BRPM | HEART RATE: 64 BPM | OXYGEN SATURATION: 97 % | SYSTOLIC BLOOD PRESSURE: 125 MMHG

## 2023-06-06 DIAGNOSIS — K86.89 PANCREATIC MASS: Primary | ICD-10-CM

## 2023-06-06 LAB
ALBUMIN SERPL-MCNC: 3.6 G/DL (ref 3.5–5)
ALBUMIN/GLOB SERPL: 1.2 (ref 1.1–2.2)
ALP SERPL-CCNC: 82 U/L (ref 45–117)
ALT SERPL-CCNC: 27 U/L (ref 12–78)
ANION GAP SERPL CALC-SCNC: 5 MMOL/L (ref 5–15)
AST SERPL-CCNC: 21 U/L (ref 15–37)
BASOPHILS # BLD: 0.1 K/UL (ref 0–0.1)
BASOPHILS NFR BLD: 1 % (ref 0–1)
BILIRUB SERPL-MCNC: 0.2 MG/DL (ref 0.2–1)
BUN SERPL-MCNC: 21 MG/DL (ref 6–20)
BUN/CREAT SERPL: 20 (ref 12–20)
CALCIUM SERPL-MCNC: 9.3 MG/DL (ref 8.5–10.1)
CHLORIDE SERPL-SCNC: 110 MMOL/L (ref 97–108)
CO2 SERPL-SCNC: 26 MMOL/L (ref 21–32)
COMMENT:: NORMAL
CREAT SERPL-MCNC: 1.07 MG/DL (ref 0.7–1.3)
DIFFERENTIAL METHOD BLD: NORMAL
EKG ATRIAL RATE: 63 BPM
EKG DIAGNOSIS: NORMAL
EKG P AXIS: 47 DEGREES
EKG P-R INTERVAL: 142 MS
EKG Q-T INTERVAL: 380 MS
EKG QRS DURATION: 90 MS
EKG QTC CALCULATION (BAZETT): 388 MS
EKG R AXIS: 33 DEGREES
EKG T AXIS: 29 DEGREES
EKG VENTRICULAR RATE: 63 BPM
EOSINOPHIL # BLD: 0.4 K/UL (ref 0–0.4)
EOSINOPHIL NFR BLD: 6 % (ref 0–7)
ERYTHROCYTE [DISTWIDTH] IN BLOOD BY AUTOMATED COUNT: 13.6 % (ref 11.5–14.5)
GLOBULIN SER CALC-MCNC: 3.1 G/DL (ref 2–4)
GLUCOSE SERPL-MCNC: 125 MG/DL (ref 65–100)
HCT VFR BLD AUTO: 40 % (ref 36.6–50.3)
HGB BLD-MCNC: 13 G/DL (ref 12.1–17)
IMM GRANULOCYTES # BLD AUTO: 0 K/UL (ref 0–0.04)
IMM GRANULOCYTES NFR BLD AUTO: 0 % (ref 0–0.5)
LIPASE SERPL-CCNC: 152 U/L (ref 73–393)
LYMPHOCYTES # BLD: 1.7 K/UL (ref 0.8–3.5)
LYMPHOCYTES NFR BLD: 22 % (ref 12–49)
MCH RBC QN AUTO: 31 PG (ref 26–34)
MCHC RBC AUTO-ENTMCNC: 32.5 G/DL (ref 30–36.5)
MCV RBC AUTO: 95.2 FL (ref 80–99)
MONOCYTES # BLD: 0.4 K/UL (ref 0–1)
MONOCYTES NFR BLD: 5 % (ref 5–13)
NEUTS SEG # BLD: 5.1 K/UL (ref 1.8–8)
NEUTS SEG NFR BLD: 66 % (ref 32–75)
NRBC # BLD: 0 K/UL (ref 0–0.01)
NRBC BLD-RTO: 0 PER 100 WBC
PLATELET # BLD AUTO: 166 K/UL (ref 150–400)
PMV BLD AUTO: 9 FL (ref 8.9–12.9)
POTASSIUM SERPL-SCNC: 4.3 MMOL/L (ref 3.5–5.1)
PROT SERPL-MCNC: 6.7 G/DL (ref 6.4–8.2)
RBC # BLD AUTO: 4.2 M/UL (ref 4.1–5.7)
SODIUM SERPL-SCNC: 141 MMOL/L (ref 136–145)
SPECIMEN HOLD: NORMAL
TROPONIN I SERPL HS-MCNC: 11 NG/L (ref 0–76)
TROPONIN I SERPL HS-MCNC: 11 NG/L (ref 0–76)
WBC # BLD AUTO: 7.7 K/UL (ref 4.1–11.1)

## 2023-06-06 PROCEDURE — 84484 ASSAY OF TROPONIN QUANT: CPT

## 2023-06-06 PROCEDURE — 85025 COMPLETE CBC W/AUTO DIFF WBC: CPT

## 2023-06-06 PROCEDURE — 83690 ASSAY OF LIPASE: CPT

## 2023-06-06 PROCEDURE — 36415 COLL VENOUS BLD VENIPUNCTURE: CPT

## 2023-06-06 PROCEDURE — 99285 EMERGENCY DEPT VISIT HI MDM: CPT

## 2023-06-06 PROCEDURE — 80053 COMPREHEN METABOLIC PANEL: CPT

## 2023-06-06 PROCEDURE — 6360000004 HC RX CONTRAST MEDICATION

## 2023-06-06 PROCEDURE — 74177 CT ABD & PELVIS W/CONTRAST: CPT

## 2023-06-06 PROCEDURE — 93005 ELECTROCARDIOGRAM TRACING: CPT | Performed by: EMERGENCY MEDICINE

## 2023-06-06 RX ADMIN — IOPAMIDOL 100 ML: 755 INJECTION, SOLUTION INTRAVENOUS at 11:56

## 2023-06-06 NOTE — ED PROVIDER NOTES
Eosinophils Absolute 0.4 0.0 - 0.4 K/UL    Basophils Absolute 0.1 0.0 - 0.1 K/UL    Absolute Immature Granulocyte 0.0 0.00 - 0.04 K/UL    Differential Type AUTOMATED     Comprehensive Metabolic Panel    Collection Time: 06/06/23 10:35 AM   Result Value Ref Range    Sodium 141 136 - 145 mmol/L    Potassium 4.3 3.5 - 5.1 mmol/L    Chloride 110 (H) 97 - 108 mmol/L    CO2 26 21 - 32 mmol/L    Anion Gap 5 5 - 15 mmol/L    Glucose 125 (H) 65 - 100 mg/dL    BUN 21 (H) 6 - 20 MG/DL    Creatinine 1.07 0.70 - 1.30 MG/DL    Bun/Cre Ratio 20 12 - 20      Est, Glom Filt Rate >60 >60 ml/min/1.73m2    Calcium 9.3 8.5 - 10.1 MG/DL    Total Bilirubin 0.2 0.2 - 1.0 MG/DL    ALT 27 12 - 78 U/L    AST 21 15 - 37 U/L    Alk Phosphatase 82 45 - 117 U/L    Total Protein 6.7 6.4 - 8.2 g/dL    Albumin 3.6 3.5 - 5.0 g/dL    Globulin 3.1 2.0 - 4.0 g/dL    Albumin/Globulin Ratio 1.2 1.1 - 2.2     Troponin    Collection Time: 06/06/23 10:35 AM   Result Value Ref Range    Troponin, High Sensitivity 11 0 - 76 ng/L   Extra Tubes Hold    Collection Time: 06/06/23 10:36 AM   Result Value Ref Range    Specimen HOld PST     Comment:        Add-on orders for these samples will be processed based on acceptable specimen integrity and analyte stability, which may vary by analyte. Lipase    Collection Time: 06/06/23 10:36 AM   Result Value Ref Range    Lipase 152 73 - 393 U/L   Troponin    Collection Time: 06/06/23 12:38 PM   Result Value Ref Range    Troponin, High Sensitivity 11 0 - 76 ng/L         EKG: When ordered, EKG's are interpreted by the Emergency Department Physician in the absence of a cardiologist.  Please see their note for interpretation of EKG.       RADIOLOGY:  Non-plain film images such as CT, Ultrasound and MRI are read by the radiologist. Plain radiographic images are visualized and preliminarily interpreted by the ED Provider with the below findings:        Interpretation per the Radiologist below, if available at the time of this

## 2023-06-13 ENCOUNTER — HOSPITAL ENCOUNTER (OUTPATIENT)
Facility: HOSPITAL | Age: 63
Setting detail: RECURRING SERIES
End: 2023-06-13
Payer: COMMERCIAL

## 2023-06-16 ENCOUNTER — HOSPITAL ENCOUNTER (OUTPATIENT)
Facility: HOSPITAL | Age: 63
Setting detail: RECURRING SERIES
Discharge: HOME | End: 2023-06-19
Payer: COMMERCIAL

## 2023-06-16 PROCEDURE — 97110 THERAPEUTIC EXERCISES: CPT

## 2023-06-19 ENCOUNTER — HOSPITAL ENCOUNTER (OUTPATIENT)
Facility: HOSPITAL | Age: 63
Discharge: HOME | End: 2023-06-22
Payer: COMMERCIAL

## 2023-06-19 DIAGNOSIS — Z85.07 PERSONAL HISTORY OF MALIGNANT NEOPLASM OF PANCREAS: ICD-10-CM

## 2023-06-19 PROCEDURE — 74178 CT ABD&PLV WO CNTR FLWD CNTR: CPT

## 2023-06-19 PROCEDURE — 6360000004 HC RX CONTRAST MEDICATION: Performed by: FAMILY MEDICINE

## 2023-06-19 RX ADMIN — IOPAMIDOL 100 ML: 755 INJECTION, SOLUTION INTRAVENOUS at 07:53

## 2023-06-20 ENCOUNTER — HOSPITAL ENCOUNTER (OUTPATIENT)
Facility: HOSPITAL | Age: 63
Setting detail: RECURRING SERIES
Discharge: HOME | End: 2023-06-23
Payer: COMMERCIAL

## 2023-06-20 PROCEDURE — 97110 THERAPEUTIC EXERCISES: CPT

## 2023-06-20 NOTE — PROGRESS NOTES
PHYSICAL THERAPY - MEDICARE DAILY TREATMENT NOTE (updated 3/23)      Date: 2023          Patient Name:  Vance Zhou :  1960   Medical   Diagnosis:  CVA (cerebro[facility] due to intracere[facility] hemorrhage) [facility] [I61.9] Treatment Diagnosis:  M62.81  GENERAL MUSCLE WEAKNESS and R26.81   Unsteadiness on feet    Referral Source:  Christina Ruiz DO Insurance:   Payor: Teresa Jones / Plan: Wilber Lyon / Product Type: *No Product type* /                     Patient  verified yes     Visit #   Current  / Total 4 24   Time   In / Out 100 147   Total Treatment Time 47   Total Timed Codes 47   1:1 Treatment Time 52      [facility] Totals Reminder:  bill using total billable   min of TIMED therapeutic procedures and modalities. 8-22 min = 1 unit; 23-37 min = 2 units; 38-52 min = 3 units; 53-67 min = 4 units; 68-82 min = 5 units            SUBJECTIVE    Pain Level (0-10 scale): 2    Any medication changes, allergies to medications, adverse drug reactions, diagnosis change, or new procedure performed?: [x] No    [] Yes (see summary sheet for update)  Medications: Verified on Patient Summary List    Subjective functional status/changes:     Patient noted they were pretty fatigued following previous treatment session, noted they did have some soreness but have continued to work on their exercises at home. OBJECTIVE      Therapeutic Procedures: Tx Min Billable or 1:1 Min (if diff from Tx Min) Procedure, Rationale, Specifics   [phone] Therapeutic Exercise (timed):  increase ROM, strength, coordination, balance, and proprioception to improve patient's ability to progress to PLOF and address remaining functional goals.  (see flow sheet as applicable)     Details if applicable:                         47 47    Total Total         [x]  Patient Education billed concurrently with other procedures   [x] Review HEP    [] Progressed/Changed HEP, detail:    [] Other detail:         Other

## 2023-06-23 ENCOUNTER — HOSPITAL ENCOUNTER (OUTPATIENT)
Facility: HOSPITAL | Age: 63
Setting detail: RECURRING SERIES
Discharge: HOME | End: 2023-06-26
Payer: COMMERCIAL

## 2023-06-23 PROCEDURE — 97110 THERAPEUTIC EXERCISES: CPT

## 2023-06-27 ENCOUNTER — HOSPITAL ENCOUNTER (OUTPATIENT)
Facility: HOSPITAL | Age: 63
Setting detail: RECURRING SERIES
Discharge: HOME | End: 2023-06-30
Payer: COMMERCIAL

## 2023-06-27 PROCEDURE — 97110 THERAPEUTIC EXERCISES: CPT

## 2023-06-29 ENCOUNTER — HOSPITAL ENCOUNTER (OUTPATIENT)
Facility: HOSPITAL | Age: 63
Setting detail: RECURRING SERIES
End: 2023-06-29
Payer: COMMERCIAL

## 2023-06-29 PROCEDURE — 97110 THERAPEUTIC EXERCISES: CPT

## 2023-07-06 ENCOUNTER — HOSPITAL ENCOUNTER (OUTPATIENT)
Facility: HOSPITAL | Age: 63
Setting detail: RECURRING SERIES
Discharge: HOME | End: 2023-07-09
Payer: COMMERCIAL

## 2023-07-06 PROCEDURE — 97110 THERAPEUTIC EXERCISES: CPT

## 2023-07-11 ENCOUNTER — HOSPITAL ENCOUNTER (OUTPATIENT)
Facility: HOSPITAL | Age: 63
Setting detail: RECURRING SERIES
Discharge: HOME | End: 2023-07-14
Payer: COMMERCIAL

## 2023-07-11 PROCEDURE — 97110 THERAPEUTIC EXERCISES: CPT

## 2023-07-11 NOTE — PROGRESS NOTES
of session (0-10 scale): 0-1      Assessment   Pt was able to tolerate all activity well and is demonstrating ability to tolerate longer bouts of activity that include heavier lifting and prolonged walking with load. Will continue to progress as able  Patient will continue to benefit from skilled PT / OT services to modify and progress therapeutic interventions, analyze and address functional mobility deficits, analyze and address ROM deficits, analyze and address strength deficits, analyze and address soft tissue restrictions, analyze and cue for proper movement patterns, analyze and modify for postural abnormalities, analyze and address imbalance/dizziness, and instruct in home and community integration to address functional deficits and attain remaining goals.     Progress toward goals / Updated goals:  []  See Progress Note/Recertification    Progressing toward goals      PLAN  Yes  Continue plan of care  Re-Cert Due: NA  []  Upgrade activities as tolerated  []  Discharge due to :  []  Other:      Khoi Tinsley, PT       7/11/2023       8:12 AM

## 2023-07-11 NOTE — PROGRESS NOTES
[facility] Physical Therapy  [facility] [address] ([place]), [address], [address]  Phone: [phone]   Fax: [phone]     PHYSICAL THERAPY PROGRESS NOTE  Patient Name:  Marcia Piña :  1960   Treatment/Medical Diagnosis: CVA (cerebrovascular accident due to intracerebral hemorrhage) [facility]) [I61.9]   Referral Source:  Frederic Good DO     Date of Initial Visit:  23 Attended Visits:  8 Missed Visits:  0     SUMMARY OF TREATMENT/ASSESSMENT:  Pt has completed 8 sessions of therapy that have focused on restoring strength, ROM, and endurance. He is progressing very well and has been diligent with his HEP. Will look to move into more dynamic balance and functional tasks to simulate work related activity over the next few weeks. He does have intermittent complaints of knee pain on the right that are unrelated to the CVA. He also complains of \"echoes\" which he describes as residual tingling in the arm and leg at times. He reports that the MD told him this is normal.  Will plan to continue to progress as able and work toward remaining goals       CURRENT STATUS  Short Term Goals: To be accomplished in 8-10 treatments  Pt will be consistent and demonstrate I with HEP MET  Pt will complain of pain 1-2/10 with all activity MET  Pt will demonstrate improved ROM to complete all aDL's and household chores more efficiently MET  Pt will be able to maintain balance in tandem for 30sec without LOB MET     Long Term Goals:  To be accomplished in 20-25 treatments  Pt will complain of pain 0/10 with all activity Progressing Toward  Pt will increase strength to stabilize his core and improve balance with all activity to return to work safely Progressing Toward  Pt will increase FOTO score by 9 points to meet [place] and demonstrate improved function with all activity Not Met  Pt will decrease radicular symptoms by 100% to complete all activity  Not Met  Pt will be

## 2023-07-13 ENCOUNTER — HOSPITAL ENCOUNTER (OUTPATIENT)
Facility: HOSPITAL | Age: 63
Setting detail: RECURRING SERIES
Discharge: HOME | End: 2023-07-16
Payer: COMMERCIAL

## 2023-07-13 PROCEDURE — 97110 THERAPEUTIC EXERCISES: CPT

## 2023-07-18 ENCOUNTER — HOSPITAL ENCOUNTER (OUTPATIENT)
Facility: HOSPITAL | Age: 63
Setting detail: RECURRING SERIES
Discharge: HOME | End: 2023-07-21
Payer: COMMERCIAL

## 2023-07-18 PROCEDURE — 97110 THERAPEUTIC EXERCISES: CPT

## 2023-07-18 NOTE — PROGRESS NOTES
PHYSICAL THERAPY - MEDICARE DAILY TREATMENT NOTE (updated 3/23)      Date: 2023          Patient Name:  Indu Milligan :  1960   Medical   Diagnosis:  CVA (cerebrovascular accident due to intracerebral hemorrhage) ([address]) [I61.9] Treatment Diagnosis:  M62.81  GENERAL MUSCLE WEAKNESS and R26.81   Unsteadiness on feet    Referral Source:  Cydney Jones DO Insurance:   Payor: Jay Alden / [facility] / Product Type: *No Product type* /                     Patient  verified yes     Visit #   Current  / Total 10 24   Time   In / Out 834 920   Total Treatment Time 46   Total Timed Codes 46   1:1 Treatment Time 55      [facility] Totals Reminder:  bill using total billable   min of TIMED therapeutic procedures and modalities. 8-22 min = 1 unit; 23-37 min = 2 units; 38-52 min = 3 units; 53-67 min = 4 units; 68-82 min = 5 units            SUBJECTIVE    Pain Level (0-10 scale): 0    Any medication changes, allergies to medications, adverse drug reactions, diagnosis change, or new procedure performed?: [x] No    [] Yes (see summary sheet for update)  Medications: Verified on Patient Summary List    Subjective functional status/changes:   Pt reports that he has felt well enough to really want to get after it. Asked what that means and he states that he feels like going back to work. 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 He is mainly concerned about the \"echoes\" and how to handle that if it happens when he is working. Pt describes intermittent bouts of tingling/paralysis of the left arm. He states that he is able to shake the arm out and it goes away. OBJECTIVE      Therapeutic Procedures:   Tx Min Billable or 1:1 Min (if diff from Tx Min) Procedure, Rationale, Specifics   [id number] Therapeutic Exercise (timed):  increase ROM, strength, coordination, balance, and proprioception to improve patient's ability to

## 2023-07-20 ENCOUNTER — HOSPITAL ENCOUNTER (OUTPATIENT)
Facility: HOSPITAL | Age: 63
Setting detail: RECURRING SERIES
Discharge: HOME | End: 2023-07-23
Payer: COMMERCIAL

## 2023-07-20 PROCEDURE — 97110 THERAPEUTIC EXERCISES: CPT

## 2023-07-20 NOTE — PROGRESS NOTES
PHYSICAL THERAPY - MEDICARE DAILY TREATMENT NOTE (updated 3/23)      Date: 2023          Patient Name:  Maricel Gilmore :  1960   Medical   Diagnosis:  CVA (cerebrovascular accident due to intracerebral hemorrhage) ([address]) [I61.9] Treatment Diagnosis:  M62.81  GENERAL MUSCLE WEAKNESS and R26.81   Unsteadiness on feet    Referral Source:  Patricia Yan DO Insurance:   Payor: [facility] / Plan: Savanna Luna / Product Type: *No Product type* /                     Patient  verified yes     Visit #   Current  / Total 11 24   Time   In / Out 804 852   Total Treatment Time 48   Total Timed Codes 48   1:1 Treatment Time 50      [facility] Totals Reminder:  bill using total billable   min of TIMED therapeutic procedures and modalities. 8-22 min = 1 unit; 23-37 min = 2 units; 38-52 min = 3 units; 53-67 min = 4 units; 68-82 min = 5 units            SUBJECTIVE    Pain Level (0-10 scale): 0    Any medication changes, allergies to medications, adverse drug reactions, diagnosis change, or new procedure performed?: [x] No    [] Yes (see summary sheet for update)  Medications: Verified on Patient Summary List    Subjective functional status/changes:   Pt reports that the addition of the band for sidestepping and monster walks has really been targeting the gluteals and he thinks that it is helping      Therapeutic Procedures: Tx Min Billable or 1:1 Min (if diff from Tx Min) Procedure, Rationale, Specifics   48 [id number] Therapeutic Exercise (timed):  increase ROM, strength, coordination, balance, and proprioception to improve patient's ability to progress to PLOF and address remaining functional goals.  (see flow sheet as applicable)     Details if applicable:                         48 48    Total Total         [x]  Patient Education billed concurrently with other procedures   [x] Review HEP    [] Progressed/Changed HEP, detail:    [] Other detail:         Other Objective/Functional Measures  NA    Pain Level

## 2023-07-25 ENCOUNTER — HOSPITAL ENCOUNTER (OUTPATIENT)
Facility: HOSPITAL | Age: 63
Setting detail: RECURRING SERIES
Discharge: HOME | End: 2023-07-28
Payer: COMMERCIAL

## 2023-07-25 PROCEDURE — 97110 THERAPEUTIC EXERCISES: CPT

## 2023-07-25 NOTE — PROGRESS NOTES
PHYSICAL THERAPY - MEDICARE DAILY TREATMENT NOTE (updated 3/23)      Date: 2023          Patient Name:  Mark Rose :  1960   Medical   Diagnosis:  CVA (cerebrovascular accident due to intracerebral hemorrhage) ([address]) [I61.9] Treatment Diagnosis:  M62.81  GENERAL MUSCLE WEAKNESS and R26.81   Unsteadiness on feet    Referral Source:  Iwona Flores DO Insurance:   Payor: Caterina Valdez / Plan: Toyin Castro / Product Type: *No Product type* /                     Patient  verified yes     Visit #   Current  / Total 12 24   Time   In / Out 804 849   Total Treatment Time 45   Total Timed Codes 45   1:1 Treatment Time 45      [facility] Totals Reminder:  bill using total billable   min of TIMED therapeutic procedures and modalities. 8-22 min = 1 unit; 23-37 min = 2 units; 38-52 min = 3 units; 53-67 min = 4 units; 68-82 min = 5 units            SUBJECTIVE    Pain Level (0-10 scale): 0    Any medication changes, allergies to medications, adverse drug reactions, diagnosis change, or new procedure performed?: [x] No    [] Yes (see summary sheet for update)  Medications: Verified on Patient Summary List    Subjective functional status/changes:   Pt reports that he is progressing and has made an appointment with the neurologist      Therapeutic Procedures: Tx Min Billable or 1:1 Min (if diff from Tx Min) Procedure, Rationale, Specifics   45 38 [id number] Therapeutic Exercise (timed):  increase ROM, strength, coordination, balance, and proprioception to improve patient's ability to progress to PLOF and address remaining functional goals.  (see flow sheet as applicable)     Details if applicable:                         45 38    Total Total         [x]  Patient Education billed concurrently with other procedures   [x] Review HEP    [] Progressed/Changed HEP, detail:    [] Other detail:         Other Objective/Functional Measures  NA    Pain Level at end of session (0-10 scale): 0-1      Assessment   Pt is

## 2023-07-26 ENCOUNTER — TELEPHONE (OUTPATIENT)
Age: 63
End: 2023-07-26

## 2023-07-26 NOTE — TELEPHONE ENCOUNTER
Verified patient with 2 identifiers   Scheduled annual follow up with Gab Membreno NP 5/31/24 at 9 AM.   Patient is requesting a return to work letter. He is looking to go back to work in August. Please advise.

## 2023-07-26 NOTE — TELEPHONE ENCOUNTER
Patient called to speak with Dr Miles Renteria regarding who he should continue care with after she leaves and getting clearance for going back work.  [phone]

## 2023-07-27 ENCOUNTER — HOSPITAL ENCOUNTER (OUTPATIENT)
Facility: HOSPITAL | Age: 63
Setting detail: RECURRING SERIES
Discharge: HOME | End: 2023-07-30
Payer: COMMERCIAL

## 2023-07-27 PROCEDURE — 97110 THERAPEUTIC EXERCISES: CPT

## 2023-07-27 NOTE — TELEPHONE ENCOUNTER
Verified patient with 2 identifiers   Advised Dr Leda Kan did write the return to work letter. Advised patient is cleared to return to work on 8/1/23. Patient requested that I mail to him. Letter put in to be mailed.

## 2023-07-27 NOTE — PROGRESS NOTES
PHYSICAL THERAPY - MEDICARE DAILY TREATMENT NOTE (updated 3/23)      Date: 2023          Patient Name:  Mariela Tobin :  1960   Medical   Diagnosis:  CVA (cerebrovascular accident due to intracerebral hemorrhage) ([address]) [I61.9] Treatment Diagnosis:  M62.81  GENERAL MUSCLE WEAKNESS and R26.81   Unsteadiness on feet    Referral Source:  Adelaide Sandoval DO Insurance:   Payor: Hilary Troy / Plan: Faisal Sandoval / Product Type: *No Product type* /                     Patient  verified yes     Visit #   Current  / Total 13 24   Time   In / Out 802 850   Total Treatment Time 48   Total Timed Codes 48   1:1 Treatment Time 44      [facility] Totals Reminder:  bill using total billable   min of TIMED therapeutic procedures and modalities. 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 He is trying to get an appointment pushed up so he can attempt to get clearance to return to work. Therapeutic Procedures: Tx Min Billable or 1:1 Min (if diff from Tx Min) Procedure, Rationale, Specifics   48 [id number] Therapeutic Exercise (timed):  increase ROM, strength, coordination, balance, and proprioception to improve patient's ability to progress to PLOF and address remaining functional goals.  (see flow sheet as applicable)     Details if applicable:                         48 39    Total Total         [x]  Patient Education billed concurrently with other procedures   [x] Review HEP    [] Progressed/Changed HEP, detail:    [] Other detail:         Other Objective/Functional

## 2023-08-01 ENCOUNTER — HOSPITAL ENCOUNTER (OUTPATIENT)
Facility: HOSPITAL | Age: 63
Setting detail: RECURRING SERIES
Discharge: HOME | End: 2023-08-04
Payer: COMMERCIAL

## 2023-08-01 PROCEDURE — 97110 THERAPEUTIC EXERCISES: CPT

## 2023-08-01 NOTE — PROGRESS NOTES
10  PHYSICAL THERAPY - MEDICARE DAILY TREATMENT NOTE (updated 3/23)      Date: 2023          Patient Name:  Rush Erazo :  1960   Medical   Diagnosis:  CVA (cerebrovascular accident due to intracerebral hemorrhage) ([address]) [I61.9] Treatment Diagnosis:  M62.81  GENERAL MUSCLE WEAKNESS and R26.81   Unsteadiness on feet    Referral Source:  Isabel Gonzalez DO Insurance:   Payor: [employer] / Plan: [email or web address] November / Product Type: *No Product type* /                     Patient  verified yes     Visit #   Current  / Total 14 24   Time   In / Out 1000 A 1047 A   Total Treatment Time 47   Total Timed Codes 47   1:1 Treatment Time 45      [facility] Totals Reminder:  bill using total billable   min of TIMED therapeutic procedures and modalities. 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  (see flow sheet as applicable)     Details if applicable:                         47 38    Total Total         [x]

## 2023-08-08 ENCOUNTER — HOSPITAL ENCOUNTER (OUTPATIENT)
Facility: HOSPITAL | Age: 63
Discharge: HOME | End: 2023-08-11
Attending: INTERNAL MEDICINE
Payer: COMMERCIAL

## 2023-08-08 DIAGNOSIS — Z87.891 PERSONAL HISTORY OF TOBACCO USE: ICD-10-CM

## 2023-08-08 DIAGNOSIS — F17.210 CIGARETTE NICOTINE DEPENDENCE, UNCOMPLICATED: ICD-10-CM

## 2023-08-08 PROCEDURE — 71271 CT THORAX LUNG CANCER SCR C-: CPT

## 2023-08-09 ENCOUNTER — HOSPITAL ENCOUNTER (OUTPATIENT)
Facility: HOSPITAL | Age: 63
Setting detail: RECURRING SERIES
Discharge: HOME | End: 2023-08-12
Payer: COMMERCIAL

## 2023-08-09 PROCEDURE — 97110 THERAPEUTIC EXERCISES: CPT

## 2023-08-16 ENCOUNTER — HOSPITAL ENCOUNTER (OUTPATIENT)
Facility: HOSPITAL | Age: 63
Setting detail: RECURRING SERIES
Discharge: HOME | End: 2023-08-19
Payer: COMMERCIAL

## 2023-08-16 PROCEDURE — 97110 THERAPEUTIC EXERCISES: CPT

## 2023-09-29 DIAGNOSIS — I61.9 CVA (CEREBROVASCULAR ACCIDENT DUE TO INTRACEREBRAL HEMORRHAGE) (HCC): Primary | ICD-10-CM

## 2024-05-31 ENCOUNTER — OFFICE VISIT (OUTPATIENT)
Age: 64
End: 2024-05-31
Payer: COMMERCIAL

## 2024-05-31 VITALS
WEIGHT: 267 LBS | HEART RATE: 75 BPM | OXYGEN SATURATION: 93 % | DIASTOLIC BLOOD PRESSURE: 76 MMHG | BODY MASS INDEX: 37.24 KG/M2 | SYSTOLIC BLOOD PRESSURE: 108 MMHG

## 2024-05-31 DIAGNOSIS — E78.5 HYPERLIPIDEMIA LDL GOAL <70: ICD-10-CM

## 2024-05-31 DIAGNOSIS — Z72.0 TOBACCO ABUSE: ICD-10-CM

## 2024-05-31 DIAGNOSIS — Z86.73 HISTORY OF STROKE: Primary | ICD-10-CM

## 2024-05-31 PROCEDURE — 99214 OFFICE O/P EST MOD 30 MIN: CPT | Performed by: NURSE PRACTITIONER

## 2024-05-31 RX ORDER — LISINOPRIL 20 MG/1
20 TABLET ORAL DAILY
COMMUNITY
Start: 2024-04-23

## 2024-05-31 ASSESSMENT — ENCOUNTER SYMPTOMS: ABDOMINAL PAIN: 1

## 2024-05-31 NOTE — PROGRESS NOTES
[facility]  [address]  [address]  [place] [address]  Tel: [phone]  Fax: [phone]      Date:  24     Name:  STEPHANIE AGUILAR  :  1960  MRN:  [id number]     PCP:  Debbie Nascimento MD    Chief Complaint   Patient presents with    Follow-up     CVA       HISTORY OF PRESENT ILLNESS:  Patient presents today for regular follow up, last seen 2023 history of stroke.  On 2023 he received a RIGHT TRANSCAROTID ARTERY REVASCULARIZATION by Dr. Goel.  Per patient he is doing well overall.  At times he has some numbness and weakness in his left side, but it does not stay.  He is scheduled to see GI next month, plans to do several tests, c/o ongoing issues with his stomach.  Dr Nascimento PCP regular follow-ups  Cardiology in January as well.  Saw Dr Goel in January, Dopplers were done, he was to continue ASA 81mg and plavix 75mg.  Tobacco: 1 PPD.  Exercise: doing it on his own, not as much as he would like to. Has worked on balance, strengthening, walking,e tc.  Patient has completed all recommended physical therapy.    Recap from last visit :   Assessment and Plan:  62-year-old gentleman with chronic tobacco use, now with right frontoparietal infarct secondary to severe right ICA stenosis now status post TCAR on  on DAPT.  He presents for follow-up.  Doing well without any recurrence of symptoms or any other new neurological issues.     Recommendations:  - Continue DAPT with aspirin 81 mg daily and Plavix 75 mg daily  - Continue high intensity statin for LDL goal of less than 70.  -PT        Problem was discussed at length with the patient.  We reviewed the results of the study in detail.  We also discussed prognosis and treatment options.  The patient had opportunity today to ask all questions, expressed understanding of the instructions provided, and agreed with the plan of treatment.        Follow up in 1 year.        REVIEW OF SYSTEMS:     Review of

## 2024-05-31 NOTE — PROGRESS NOTES
Patient reports no neurological concerns  Did mention some off and on Brainfog  Has been active physically

## 2024-09-28 ENCOUNTER — HOSPITAL ENCOUNTER (OUTPATIENT)
Facility: HOSPITAL | Age: 64
Discharge: HOME | End: 2024-10-01
Attending: INTERNAL MEDICINE
Payer: COMMERCIAL

## 2024-09-28 DIAGNOSIS — Z79.02 LONG TERM CURRENT USE OF ANTITHROMBOTICS/ANTIPLATELETS: ICD-10-CM

## 2024-09-28 DIAGNOSIS — K22.10 EROSIVE ESOPHAGITIS: ICD-10-CM

## 2024-09-28 DIAGNOSIS — R10.13 DYSPEPSIA: ICD-10-CM

## 2024-09-28 DIAGNOSIS — Z86.73 HISTORY OF STROKE: ICD-10-CM

## 2024-09-28 DIAGNOSIS — Z95.828 INTERNAL CAROTID ARTERY STENT PRESENT: ICD-10-CM

## 2024-09-28 DIAGNOSIS — F17.219 CIGARETTE NICOTINE DEPENDENCE WITH NICOTINE-INDUCED DISORDER: ICD-10-CM

## 2024-09-28 DIAGNOSIS — R10.13 EPIGASTRIC PAIN: ICD-10-CM

## 2024-09-28 DIAGNOSIS — Q45.3 ABNORMALITY OF PANCREATIC DUCT: ICD-10-CM

## 2024-09-28 LAB — CREAT BLD-MCNC: 1.5 MG/DL (ref 0.6–1.3)

## 2024-09-28 PROCEDURE — 6360000004 HC RX CONTRAST MEDICATION: Performed by: INTERNAL MEDICINE

## 2024-09-28 PROCEDURE — 82565 ASSAY OF CREATININE: CPT

## 2024-09-28 PROCEDURE — 74170 CT ABD WO CNTRST FLWD CNTRST: CPT

## 2024-09-28 RX ORDER — IOPAMIDOL 755 MG/ML
100 INJECTION, SOLUTION INTRAVASCULAR
Status: COMPLETED | OUTPATIENT
Start: 2024-09-28 | End: 2024-09-28

## 2024-09-28 RX ADMIN — IOPAMIDOL 100 ML: 755 INJECTION, SOLUTION INTRAVENOUS at 11:34

## 2024-10-26 ENCOUNTER — HOSPITAL ENCOUNTER (OUTPATIENT)
Facility: HOSPITAL | Age: 64
Discharge: HOME | End: 2024-10-29
Attending: INTERNAL MEDICINE
Payer: COMMERCIAL

## 2024-10-26 DIAGNOSIS — F17.200 CURRENT SMOKER: ICD-10-CM

## 2024-10-26 DIAGNOSIS — Z87.891 PERSONAL HISTORY OF TOBACCO USE: ICD-10-CM

## 2024-10-26 PROCEDURE — 71271 CT THORAX LUNG CANCER SCR C-: CPT

## 2025-02-14 ENCOUNTER — TELEPHONE (OUTPATIENT)
Age: 65
End: 2025-02-14

## 2025-02-14 NOTE — TELEPHONE ENCOUNTER
Patient called stating that his pcp Dr Melody Nascimento is requesting a copy of his driving eval and cognitive eval he had done at [facility], showing that he is cleared to return to work. Patient asked if he could come by the office and pick it up. Please advise. [phone]

## 2025-02-17 NOTE — TELEPHONE ENCOUNTER
Verified patient with 2 identifiers   Verified what patient wanted a copy of was the driving eval from 10/11/2023-[facility]. Patient confirmed that is what he needs. Copy of drivers eval left up front for  as well as a release form for patient to sign.

## 2025-06-02 ENCOUNTER — OFFICE VISIT (OUTPATIENT)
Age: 65
End: 2025-06-02
Payer: COMMERCIAL

## 2025-06-02 VITALS
BODY MASS INDEX: 37.94 KG/M2 | HEART RATE: 72 BPM | DIASTOLIC BLOOD PRESSURE: 70 MMHG | OXYGEN SATURATION: 95 % | SYSTOLIC BLOOD PRESSURE: 110 MMHG | WEIGHT: 271 LBS | HEIGHT: 71 IN | RESPIRATION RATE: 18 BRPM

## 2025-06-02 DIAGNOSIS — Z72.0 TOBACCO ABUSE: ICD-10-CM

## 2025-06-02 DIAGNOSIS — E78.5 HYPERLIPIDEMIA LDL GOAL <70: ICD-10-CM

## 2025-06-02 DIAGNOSIS — I65.23 BILATERAL CAROTID ARTERY STENOSIS: ICD-10-CM

## 2025-06-02 DIAGNOSIS — Z86.73 HISTORY OF STROKE: Primary | ICD-10-CM

## 2025-06-02 PROCEDURE — 99214 OFFICE O/P EST MOD 30 MIN: CPT | Performed by: NURSE PRACTITIONER

## 2025-06-02 ASSESSMENT — PATIENT HEALTH QUESTIONNAIRE - PHQ9
SUM OF ALL RESPONSES TO PHQ QUESTIONS 1-9: 0
2. FEELING DOWN, DEPRESSED OR HOPELESS: NOT AT ALL
SUM OF ALL RESPONSES TO PHQ QUESTIONS 1-9: 0
SUM OF ALL RESPONSES TO PHQ QUESTIONS 1-9: 0
1. LITTLE INTEREST OR PLEASURE IN DOING THINGS: NOT AT ALL
SUM OF ALL RESPONSES TO PHQ QUESTIONS 1-9: 0

## 2025-06-02 ASSESSMENT — ENCOUNTER SYMPTOMS: EYES NEGATIVE: 1

## 2025-06-02 NOTE — PROGRESS NOTES
Chief Complaint   Patient presents with    Neurologic Problem     Hx of CVA - doing well - did have a reaction after colonoscopy -12/2024- and would like to discuss      1. Have you been to the ER, urgent care clinic since your last visit?  Hospitalized since your last visit? No     2. Have you seen or consulted any other health care providers outside of the [facility] since your last visit?  Include any pap smears or colon screening.  Yes PCP   Dr Nunez - GI    
continue close monitoring and follow-up via primary care, cardiology as well as vascular.  Diagnostics to include the Brain MRI and Head/neck CTA reviewed. He is instructed to continue the 81 mg aspirin as well as 75 mg Plavix daily.  Patient is to continue Crestor as well.  Discussed the importance of smoking cessation.  Patient notes when he was seen by Dr. Goel he ordered and reviewed the carotid Dopplers, there is no need for us to repeat at this time.  Continue home exercise program. Patient was counseled on stroke education and understands if has additional symptoms of slurred speech, weakness, sensation loss or vision loss to call 911. Discussed with benefits of a low fat, low sodium diet (mediterranean diet)with the addition of multiple servings of fruits and vegetables. Also discussed importance of physical activity, at least 30 min 3 times a day. Counseled on the importance of medication compliance.  2.  Hyperlipidemia LDL goal less than 70: I am unable to visualize most recent labs, he is to continue Crestor as prescribed as well as work on improving dietary intake. Will defer to PCP for ongoing monitoring and management.  3.  Tobacco abuse: Smoking cessation encouraged.    REVIEW OF SYSTEMS:     Review of Systems   Eyes: Negative.    Musculoskeletal:  Negative for gait problem.   Neurological:  Negative for weakness and numbness.   Psychiatric/Behavioral:  Negative for confusion and decreased concentration.    All other systems reviewed and are negative.          Current Outpatient Medications   Medication Sig    lisinopril (PRINIVIL;ZESTRIL) 20 MG tablet Take 1 tablet by mouth daily    Multiple Vitamin (MULTI VITAMIN DAILY PO) Take by mouth    Famotidine (PEPCID AC PO) Take by mouth daily    aspirin 81 MG EC tablet Take 1 tablet by mouth daily    rosuvastatin (CRESTOR) 40 MG tablet Take 1 tablet by mouth nightly    clopidogrel (PLAVIX) 75 MG tablet Take 1 tablet by mouth daily for 30 doses     No

## 2025-09-05 ENCOUNTER — TRANSCRIBE ORDERS (OUTPATIENT)
Facility: HOSPITAL | Age: 65
End: 2025-09-05

## 2025-09-05 DIAGNOSIS — R10.13 DYSPEPSIA: ICD-10-CM

## 2025-09-05 DIAGNOSIS — Q45.3 ABNORMALITY OF PANCREATIC DUCT: Primary | ICD-10-CM

## 2025-09-05 DIAGNOSIS — R12 HEART BURN: ICD-10-CM

## 2025-09-05 DIAGNOSIS — K21.00 GASTROESOPHAGEAL REFLUX DISEASE WITH ESOPHAGITIS, UNSPECIFIED WHETHER HEMORRHAGE: ICD-10-CM

## 2025-09-05 DIAGNOSIS — K86.2 PANCREATIC CYST: ICD-10-CM

## (undated) DEVICE — Device

## (undated) DEVICE — BLADE CLIPPER GEN PURP NS

## (undated) DEVICE — BLANKET WRM W25XL64IN NONWOVEN SFT LTWT PLIABLE HYPR

## (undated) DEVICE — SYRINGE ANGIO CNTRST DEL 20 CC POLYCARB LIGHT GRN MEDALLION

## (undated) DEVICE — GUIDEWIRE VASCULAR L95CM DIA0.014IN ENROUTE

## (undated) DEVICE — GLOVE SURG SZ 8 L12IN FNGR THK94MIL STD WHT LTX FREE

## (undated) DEVICE — ANGLED-TIP ARTERIAL SHEATH CONFIGURATION: Brand: ENROUTE TRANSCAROTID NEUROPROTECTION SYSTEM

## (undated) DEVICE — GUIDEWIRE ENDOSCP L150CM DIA0.035IN TIP L15CM BENT PTFE

## (undated) DEVICE — PROVE COVER: Brand: UNBRANDED

## (undated) DEVICE — CATHETER BERENSTEIN 5F 40CM SOFT-VU

## (undated) DEVICE — SUTURE MCRYL SZ 4-0 L27IN ABSRB UD L19MM PS-2 1/2 CIR PRIM Y426H

## (undated) DEVICE — MICROPUNCTURE INTRODUCER SET SILHOUETTE TRANSITIONLESS WITH STAINLESS STEEL WIRE GUIDE: Brand: MICROPUNCTURE

## (undated) DEVICE — INTRODUCER SHTH 0.018 IN 21 GAX7 CM TRANSCAROTID ACCS KT

## (undated) DEVICE — SUTURE N ABSRB MONOFILAMENT 6-0 BV1 24 IN DA BLU PROLENE EP8805H

## (undated) DEVICE — PTA BALLOON DILATATION CATHETER: Brand: STERLING™

## (undated) DEVICE — SUTURE PERMA-HAND SZ 2-0 L30IN NONABSORBABLE BLK L26MM SH K833H

## (undated) DEVICE — SOLUTION IV 500ML 0.9% SOD CHL PH 5 INJ USP VIAFLX PLAS

## (undated) DEVICE — STERILE COTTON BALLS LARGE 5/P: Brand: MEDLINE

## (undated) DEVICE — DRAPE,FEM ANGIO,2 WIN,STERILE: Brand: MEDLINE

## (undated) DEVICE — AV FISTULA - MRMC: Brand: MEDLINE INDUSTRIES, INC.

## (undated) DEVICE — DRAPE,LAPAROTOMY,PED,STERILE: Brand: MEDLINE

## (undated) DEVICE — SUTURE VCRL SZ 3-0 L27IN ABSRB UD L26MM SH 1/2 CIR J416H

## (undated) DEVICE — Device: Brand: JELCO

## (undated) DEVICE — GOWN,SIRUS,NONRNF,SETINSLV,2XL,18/CS: Brand: MEDLINE

## (undated) DEVICE — ADHESIVE SKIN CLSR 0.7ML TOP DERMBND ADV

## (undated) DEVICE — SUTURE PROL SZ 5-0 L24IN NONABSORBABLE BLU RB-2 L13IN 1/2 8554H

## (undated) DEVICE — AGENT HEMSTAT W4XL4IN OXIDIZED REGENERATED CELOS ABSRB SFT

## (undated) DEVICE — INFLATION DEVICE: Brand: ENCORE™ 26